# Patient Record
Sex: FEMALE | Race: WHITE | NOT HISPANIC OR LATINO | Employment: FULL TIME | URBAN - METROPOLITAN AREA
[De-identification: names, ages, dates, MRNs, and addresses within clinical notes are randomized per-mention and may not be internally consistent; named-entity substitution may affect disease eponyms.]

---

## 2017-01-09 ENCOUNTER — TRANSCRIBE ORDERS (OUTPATIENT)
Dept: ADMINISTRATIVE | Facility: HOSPITAL | Age: 57
End: 2017-01-09

## 2017-01-09 DIAGNOSIS — Z13.9 SCREENING: Primary | ICD-10-CM

## 2017-02-02 ENCOUNTER — GENERIC CONVERSION - ENCOUNTER (OUTPATIENT)
Dept: OTHER | Facility: OTHER | Age: 57
End: 2017-02-02

## 2017-02-02 ENCOUNTER — HOSPITAL ENCOUNTER (OUTPATIENT)
Dept: MAMMOGRAPHY | Facility: CLINIC | Age: 57
Discharge: HOME | End: 2017-02-02
Payer: COMMERCIAL

## 2017-02-02 DIAGNOSIS — Z13.9 SCREENING: ICD-10-CM

## 2017-02-02 DIAGNOSIS — Z12.31 ENCOUNTER FOR SCREENING MAMMOGRAM FOR MALIGNANT NEOPLASM OF BREAST: ICD-10-CM

## 2017-02-02 PROCEDURE — G0202 SCR MAMMO BI INCL CAD: HCPCS

## 2017-04-06 ENCOUNTER — ALLSCRIPTS OFFICE VISIT (OUTPATIENT)
Dept: OTHER | Facility: OTHER | Age: 57
End: 2017-04-06

## 2017-04-06 DIAGNOSIS — E04.1 NONTOXIC SINGLE THYROID NODULE: ICD-10-CM

## 2017-04-06 DIAGNOSIS — E05.00 THYROTOXICOSIS WITH DIFFUSE GOITER AND WITHOUT THYROID STORM: ICD-10-CM

## 2017-04-14 ENCOUNTER — GENERIC CONVERSION - ENCOUNTER (OUTPATIENT)
Dept: OTHER | Facility: OTHER | Age: 57
End: 2017-04-14

## 2017-04-14 LAB
BASOPHILS # BLD AUTO: 0 %
BASOPHILS # BLD AUTO: 0 X10E3/UL (ref 0–0.2)
DEPRECATED RDW RBC AUTO: 15.2 % (ref 12.3–15.4)
EOSINOPHIL # BLD AUTO: 0.1 X10E3/UL (ref 0–0.4)
EOSINOPHIL # BLD AUTO: 2 %
HCT VFR BLD AUTO: 41.9 % (ref 34–46.6)
HGB BLD-MCNC: 13.7 G/DL (ref 11.1–15.9)
IMM.GRANULOCYTES (CD4/8) (HISTORICAL): 0 %
IMM.GRANULOCYTES (CD4/8) (HISTORICAL): 0 X10E3/UL (ref 0–0.1)
LYMPHOCYTES # BLD AUTO: 1.5 X10E3/UL (ref 0.7–3.1)
LYMPHOCYTES # BLD AUTO: 28 %
MCH RBC QN AUTO: 28 PG (ref 26.6–33)
MCHC RBC AUTO-ENTMCNC: 32.7 G/DL (ref 31.5–35.7)
MCV RBC AUTO: 86 FL (ref 79–97)
MONOCYTES # BLD AUTO: 0.7 X10E3/UL (ref 0.1–0.9)
MONOCYTES (HISTORICAL): 13 %
NEUTROPHILS # BLD AUTO: 3 X10E3/UL (ref 1.4–7)
NEUTROPHILS # BLD AUTO: 57 %
PLATELET # BLD AUTO: 234 X10E3/UL (ref 150–379)
RBC (HISTORICAL): 4.89 X10E6/UL (ref 3.77–5.28)
WBC # BLD AUTO: 5.3 X10E3/UL (ref 3.4–10.8)

## 2017-04-15 LAB
T3FREE SERPL-MCNC: 85 NG/DL (ref 71–180)
T4 FREE SERPL-MCNC: 0.81 NG/DL (ref 0.82–1.77)
TSH SERPL DL<=0.05 MIU/L-ACNC: 0.06 UIU/ML (ref 0.45–4.5)

## 2017-04-16 ENCOUNTER — GENERIC CONVERSION - ENCOUNTER (OUTPATIENT)
Dept: OTHER | Facility: OTHER | Age: 57
End: 2017-04-16

## 2017-09-19 ENCOUNTER — ALLSCRIPTS OFFICE VISIT (OUTPATIENT)
Dept: OTHER | Facility: OTHER | Age: 57
End: 2017-09-19

## 2017-09-25 ENCOUNTER — GENERIC CONVERSION - ENCOUNTER (OUTPATIENT)
Dept: OTHER | Facility: OTHER | Age: 57
End: 2017-09-25

## 2017-09-26 ENCOUNTER — GENERIC CONVERSION - ENCOUNTER (OUTPATIENT)
Dept: OTHER | Facility: OTHER | Age: 57
End: 2017-09-26

## 2017-09-26 LAB
T4 FREE SERPL-MCNC: 1.76 NG/DL (ref 0.82–1.77)
TSH SERPL DL<=0.05 MIU/L-ACNC: <0.005 UIU/ML (ref 0.45–4.5)

## 2017-09-30 ENCOUNTER — HOSPITAL ENCOUNTER (OUTPATIENT)
Dept: ULTRASOUND IMAGING | Facility: HOSPITAL | Age: 57
Discharge: HOME | End: 2017-09-30
Attending: INTERNAL MEDICINE
Payer: COMMERCIAL

## 2017-09-30 DIAGNOSIS — E05.00 THYROTOXICOSIS WITH DIFFUSE GOITER AND WITHOUT THYROID STORM: ICD-10-CM

## 2017-09-30 DIAGNOSIS — E04.1 NONTOXIC SINGLE THYROID NODULE: ICD-10-CM

## 2017-09-30 PROCEDURE — 76536 US EXAM OF HEAD AND NECK: CPT

## 2017-10-09 ENCOUNTER — GENERIC CONVERSION - ENCOUNTER (OUTPATIENT)
Dept: OTHER | Facility: OTHER | Age: 57
End: 2017-10-09

## 2017-11-02 DIAGNOSIS — E05.90 THYROTOXICOSIS WITHOUT THYROID STORM: ICD-10-CM

## 2018-01-10 NOTE — RESULT NOTES
Verified Results  * MAMMO SCREENING BILATERAL W CAD [id number] 08:35AM Tianna العلي     Test Name Result Flag Reference   MAMMO SCREENING BILATERAL W CAD (Report)     Patient History:   Patient is postmenopausal    Family history of breast cancer at age 34 in sister  Retro-pectoral silicone gel implants in both breasts, Fiorella 10,    2010  Taking estrogen for 7 months  Patient is a some day smoker  Patient's BMI is 21 3  Reason for exam: history of breast augmentation, asymptomatic  Mammo Screening Bilateral W CAD: February 2, 2017 - Check In #:    [de-identified]   Bilateral MLO, CC, and ID view(s) were taken  Technologist: Verlinda Mohs, R T (R)(M)   Prior study comparison: February 1, 2016, mammo screening    bilateral W CAD performed at [facility]  November 11, 2014, digital bilateral    screening mammogram performed at [facility]  November 4, 2013, digital bilateral screening   mammogram performed at [address] [place] [address]  November 8, 2011, digital bilateral screening mammogram    performed at [facility]  June 11, 2010, digital bilateral screening mammogram performed at [facility]  There are scattered fibroglandular densities  No dominant soft tissue mass, architectural distortion or    suspicious calcifications are noted  The skin and nipple    structures are within normal limits  Scattered benign appearing    calcifications are noted  No mammographic evidence of malignancy  No    significant changes when compared with prior studies  ACR BI-RADS® Assessments: BiRad:2 - Benign     Recommendation:   Routine screening mammogram of both breasts in 1 year  A    reminder letter will be sent  Analyzed by CAD     8-10% of cancers will be missed on mammography   Management of a    palpable abnormality must be based on clinical grounds  Patients   will be notified of their results via letter from our facility  Accredited by [employer] and FDA  Transcription Location: NICHOLAS Good 98: [id number]     Risk Value(s):   Tyrer-Cuzick 10 Year: 5 200%, Tyrer-Cuzick Lifetime: 16 600%,    Myriad Table: 2 6%, AFSHIN 5 Year: 2 5%, NCI Lifetime: 16 5%   Signed by: Alistair Negron MD   2/2/17       Plan  Encounter for screening mammogram for malignant neoplasm of breast    · * MAMMO SCREENING BILATERAL W CAD; Status:Active;  Requested for:[id number];

## 2018-01-11 ENCOUNTER — ALLSCRIPTS OFFICE VISIT (OUTPATIENT)
Dept: OTHER | Facility: OTHER | Age: 58
End: 2018-01-11

## 2018-01-11 DIAGNOSIS — M47.812 SPONDYLOSIS OF CERVICAL REGION WITHOUT MYELOPATHY OR RADICULOPATHY: ICD-10-CM

## 2018-01-11 NOTE — RESULT NOTES
Verified Results  (1) TSH [id number] 10:14AM Alice Tracy     Test Name Result Flag Reference   TSH <0 005 uIU/mL L 0 450-4 500     (1) T3 TOTAL [id number] 10:14AM Ish Rileyat     Test Name Result Flag Reference   Triiodothyronine (T3) 243 ng/dL H      (1) FREE T3 [id number] 10:14AM Ish Muscat     Test Name Result Flag Reference   Triiodothyronine,Free,Serum 7 8 pg/mL H 2 0-4 4     (LC) Thyroxine (T4) Free, Direct, S [id number] 10:14AM Ish Muscat     Test Name Result Flag Reference   T4,Free(Direct) 2 16 ng/dL H 0 82-1 77

## 2018-01-12 ENCOUNTER — TRANSCRIBE ORDERS (OUTPATIENT)
Dept: ADMINISTRATIVE | Facility: HOSPITAL | Age: 58
End: 2018-01-12

## 2018-01-12 ENCOUNTER — HOSPITAL ENCOUNTER (OUTPATIENT)
Dept: RADIOLOGY | Facility: HOSPITAL | Age: 58
Discharge: HOME | End: 2018-01-12
Attending: INTERNAL MEDICINE
Payer: COMMERCIAL

## 2018-01-12 DIAGNOSIS — M47.812 SPONDYLOSIS OF CERVICAL REGION WITHOUT MYELOPATHY OR RADICULOPATHY: ICD-10-CM

## 2018-01-12 PROCEDURE — 72050 X-RAY EXAM NECK SPINE 4/5VWS: CPT

## 2018-01-12 NOTE — RESULT NOTES
Message   Thyroid labs have improved significantly, please leave the numbers for each lab test on msg, she wanted to know  Reduce methimazole to 15mg daily (1 5 tab) check TSH/Free T4 in 1 month        Verified Results  (1) TSH [id number] 10:38AM [facility]     Test Name Result Flag Reference   TSH <0 005 uIU/mL L 0 450-4 500     (1) T3 TOTAL [id number] 10:38AM [facility]     Test Name Result Flag Reference   Triiodothyronine (T3) 157 ng/dL       (LC) Thyroxine (T4) Free, Direct, S [id number] 10:38AM [facility]     Test Name Result Flag Reference   T4,Free(Direct) 1 63 ng/dL  0 82-1 77

## 2018-01-12 NOTE — RESULT NOTES
Verified Results  * MAMMO SCREENING BILATERAL W CAD [id number] 07:51AM Ivanna John     Test Name Result Flag Reference   MAMMO SCREENING BILATERAL W CAD (Report)     Patient History:   Patient is postmenopausal    Family history of unknown cancer in maternal aunt under age 48    and breast cancer in sister at age 27  Retro-pectoral silicone gel implants in both breasts, Fiorella 10,    2010  Patient is a some day smoker  Patient's BMI is 19 5  Reason for exam: screening (asymptomatic)  Mammo Screening Bilateral W CAD: February 1, 2016 - Check In #:    [de-identified]   Bilateral MLO, CC, and ID view(s) were taken  Technologist: NICHOLAS Robin (R)(M)   Prior study comparison: November 11, 2014, digital bilateral    screening mammogram performed at [facility]  November 4, 2013, digital bilateral screening   mammogram performed at [address] [place] [address]  November 8, 2011, digital bilateral screening mammogram    performed at [facility]  June 11, 2010, digital bilateral screening mammogram performed at [facility]  There are scattered fibroglandular densities  No dominant soft tissue mass, architectural distortion or    suspicious calcifications are noted  The skin and nipple    structures are within normal limits  Scattered benign appearing    calcifications are noted  No mammographic evidence of malignancy  No    significant changes when compared with prior studies  ASSESSMENT: BiRad:2 - Benign     Recommendation:   Routine screening mammogram of both breasts in 1 year  A    reminder letter will be sent  Analyzed by CAD     8-10% of cancers will be missed on mammography  Management of a    palpable abnormality must be based on clinical grounds  Patients   will be notified of their results via letter from our facility     Accredited by [employer] and FDA  Transcription Location: NICHOLAS Good 98: [id number]     Risk Value(s):   Tyrer-Cuzick 10 Year: 4 480%, Tyrer-Cuzick Lifetime: 15 248%,    Myriad Table: 2 6%, AFSHIN 5 Year: 2 4%, NCI Lifetime: 16 8%   Signed by:    Sebastian Hahn MD   2/1/16

## 2018-01-13 ENCOUNTER — GENERIC CONVERSION - ENCOUNTER (OUTPATIENT)
Dept: OTHER | Facility: OTHER | Age: 58
End: 2018-01-13

## 2018-01-13 VITALS
SYSTOLIC BLOOD PRESSURE: 110 MMHG | HEART RATE: 80 BPM | DIASTOLIC BLOOD PRESSURE: 60 MMHG | BODY MASS INDEX: 23.73 KG/M2 | WEIGHT: 151.19 LBS | HEIGHT: 67 IN

## 2018-01-13 NOTE — RESULT NOTES
Verified Results  (1) COMPREHENSIVE METABOLIC PANEL [id number] 58:82XC [facility]     Test Name Result Flag Reference   Glucose, Serum 91 mg/dL  65-99   BUN 5 mg/dL L 6-24   Creatinine, Serum 0 67 mg/dL  0 57-1 00   eGFR If NonAfricn Am 99 mL/min/1 73  >59   eGFR If Africn Am 114 mL/min/1 73  >59   BUN/Creatinine Ratio 7 L 9-23   Sodium, Serum 142 mmol/L  134-144   Potassium, Serum 4 1 mmol/L  3 5-5 2   Chloride, Serum 102 mmol/L     Carbon Dioxide, Total 25 mmol/L  18-29   Calcium, Serum 9 6 mg/dL  8 7-10 2   Protein, Total, Serum 6 5 g/dL  6 0-8 5   Albumin, Serum 4 4 g/dL  3 5-5 5   Globulin, Total 2 1 g/dL  1 5-4 5   A/G Ratio 2 1  1 1-2 5   Bilirubin, Total 0 4 mg/dL  0 0-1 2   Alkaline Phosphatase, S 103 IU/L     AST (SGOT) 32 IU/L  0-40   ALT (SGPT) 44 IU/L H 0-32     (1) CBC/ PLT (NO DIFF) [id number] 11:46AM Chery Tarcy     Test Name Result Flag Reference   WBC 5 5 x10E3/uL  3 4-10 8   RBC 4 95 x10E6/uL  3 77-5 28   Hemoglobin 13 2 g/dL  11 1-15 9   Hematocrit 40 0 %  34 0-46  6   MCV 81 fL  79-97   MCH 26 7 pg  26 6-33 0   MCHC 33 0 g/dL  31 5-35 7   RDW 13 6 %  12 3-15 4   Platelets 470 R12G7/AN  150-379     (1) TSH [id number] 11:46AM [facility]     Test Name Result Flag Reference   TSH <0 005 uIU/mL L 0 450-4 500

## 2018-01-13 NOTE — PROGRESS NOTES
Assessment   1  Spondylosis of cervical region without myelopathy or radiculopathy (721 0) (M47 812)   2  GERD (gastroesophageal reflux disease) (530 81) (K21 9)   3  Graves disease (242 00) (E05 00)    Plan   GERD (gastroesophageal reflux disease)    · Pantoprazole Sodium 40 MG Oral Tablet Delayed Release; Take 1 tablet daily  Graves disease    · 1 - Moni LUCAS, Tresa Canavan  (Endocrinology) Co-Management  *  Status: Active  Requested    for: 66TSU7686  Care Summary provided  : Yes  Spondylosis of cervical region without myelopathy or radiculopathy    · * XR SPINE CERVICAL COMPLETE 4 OR 5 VW NON INJURY; Status:Active; Requested    BRF:12FOP2814;    · *1 -  PHYSICAL THERAPY-[facility] Co-Management  *  Status: Active     Requested for: 28HVN4297  Care Summary provided  : Yes    Chief Complaint   Pt is here today c/o of neck strain and pain this has been bothering her for awhile however recently it has gotten much worse  She has pain when she lifts her purse  Medications are current DD      History of Present Illness   Gastroesophageal Reflux Disease (Follow-Up): The patient is being seen for follow-up of gastroesophageal reflux disease  The patient reports doing well  Interval symptoms:  denies heartburn,-- denies abdominal pain,-- denies acid regurgitation-- and-- denies dysphagia  Associated symptoms: no melena-- and-- no weight loss  Medications:  the patient is adherent to her medication regimen  Neck Pain: The patient is being seen for an initial evaluation of neck pain  The patient presents with complaints of gradual onset of constant episodes of moderate bilateral posterior neck pain, described as sharp, radiating to the right trapezius  Episodes started 5 months ago  Symptoms are improved by ice  Symptoms are made worse by neck movement  Symptoms are worsening  Review of Systems        Cardiovascular: no chest pain  Respiratory: no shortness of breath-- and-- no cough  Gastrointestinal: as noted in HPI  Neurological: no numbness-- and-- no tingling  Active Problems   1  Allergic rhinitis (477 9) (J30 9)   2  GERD (gastroesophageal reflux disease) (530 81) (K21 9)   3  Graves disease (242 00) (E05 00)    Past Medical History   Active Problems And Past Medical History Reviewed: The active problems and past medical history were reviewed and updated today  Social History    · Being A Social Drinker   · Caffeine Use   · 3 CUPS PER DAY   · Cultural background   · NON-   · Current Smoker (V15 82)   ·    · Former smoker (V15 82) (M36 778)   · Previous [address]   · active   · Primary language is English   · Racial background   · WHITE  The social history was reviewed and updated today  Current Meds    1  MethIMAzole 5 MG Oral Tablet; TAKE 1 TABLET ONCE DAILY; Therapy: [id number] to (Shelli Bauer)  Requested for: 16PAU0335; Last     Rx:02Oct2017 Ordered   2  Pantoprazole Sodium 40 MG Oral Tablet Delayed Release; Take 1 tablet daily; Therapy: 46TAW6117 to (Warren Cuellarland)  Requested for: 26Oct2017 Ordered     The medication list was reviewed and updated today  Allergies   1  No Known Drug Allergies  2  Animal dander   3  Dust   4  Pollen   5  Trees    Vitals    Recorded: 38TJX7378 04:30PM   Temperature 98 2 F, Tympanic   Heart Rate 85, R DP   Pulse Quality Normal, R DP   Systolic 98, LUE, Sitting   Diastolic 60, LUE, Sitting   Height 5 ft 6 5 in   Weight 153 lb 0 5 oz   BMI Calculated 24 33   BSA Calculated 1 79   O2 Saturation 98, RA     Physical Exam        Constitutional      General appearance: No acute distress, well appearing and well nourished  Pulmonary      Auscultation of lungs: Clear to auscultation  Cardiovascular      Auscultation of heart: Normal rate and rhythm, normal S1 and S2, without murmurs         Musculoskeletal      Gait and station: Normal        Inspection/palpation of joints, bones, and muscles: Abnormal   Palpation - C7 tenderness  Neurologic      Reflexes: Abnormal  -- (upper extremity strength 5/5 b/l=, bulk is normal) Deep tendon reflexes: 1+ right biceps,-- 1+ left biceps,-- 1+ right triceps,-- 1+ left triceps,-- 1+ right brachioradialis-- and-- 1+ left brachioradialis  Sensation: No sensory loss  Sensory exam: intact to light touch  Future Appointments      Date/Time Provider Specialty Site   03/21/2018 08:00 AM MOI Watts   Endocrinology [facility] ENDOCRINOLOGY     Signatures    Electronically signed by : MOI Moreno ; Jan 11 2018  5:04PM EST                       (Author)

## 2018-01-14 VITALS
WEIGHT: 149.01 LBS | DIASTOLIC BLOOD PRESSURE: 86 MMHG | HEIGHT: 67 IN | BODY MASS INDEX: 23.39 KG/M2 | HEART RATE: 80 BPM | SYSTOLIC BLOOD PRESSURE: 118 MMHG

## 2018-01-14 NOTE — RESULT NOTES
Discussion/Summary   didnot show any distinct thyroid nodules , will monitor     Verified Results  [address] [place] [address] [id number] 10:41AM Lupe Telles Order Number: [id number]    - Patient Instructions: To schedule this appointment, please contact Central Scheduling at [phone]  Test Name Result Flag Reference   US THYROID (Report)     THYROID ULTRASOUND     INDICATION: Nontoxic thyroid nodule  COMPARISON: Thyroid ultrasound 10/29/2016     TECHNIQUE:  Ultrasound of the thyroid was performed with a high frequency linear transducer in transverse and sagittal planes including volumetric imaging sweeps as well as traditional still imaging technique  FINDINGS:   Thyroid parenchyma is diffusely heterogeneous in echotexture  Right gland: 3 9 x 1 7 x 1 2 cm  No dominant nodules  Left gland: 4 5 x 1 8 x 1 4 cm  No dominant nodules  Isthmus: The isthmus is 0 2 cm in AP dimension  IMPRESSION:      Heterogeneous appearance of the thyroid gland without discrete nodule               Workstation performed: [id number]     Signed by:   Vin Park MD   10/4/17

## 2018-01-14 NOTE — RESULT NOTES
Discussion/Summary   It appears that the thyroid is becoming overactive again-- resume methimazole at 5mg daily    Check TSH and Free T4 and Total T3 in 1 month     Verified Results  (1) TSH [id number] 11:38AM Rubin Abed     Test Name Result Flag Reference   TSH <0 005 uIU/mL L 0 450-4 500     (LC) Thyroxine (T4) Free, Direct, S [id number] 11:38AM Rubin Abed     Test Name Result Flag Reference   T4,Free(Direct) 1 76 ng/dL  0 82-1 77

## 2018-01-15 NOTE — RESULT NOTES
Verified Results  * NM THYROID Abdi Johnston UPTAKE(S) [id number] 07:38AM Cameron Campos Order Number: [de-identified]    - Patient Instructions: To schedule this appointment, please contact Central Scheduling at [phone]   Order Number: [id number]    - Patient Instructions: To schedule this appointment, please contact Central Scheduling at [phone]  Test Name Result Flag Reference   NM THYROID IMAGING W UPTAKE(S) (Report)     THYROID SCAN AND UPTAKE     INDICATION: Difficulty swallowing, weight loss     COMPARISON: None available     TECHNIQUE:    The study was performed following the oral administration of 253 uCi I-123  FINDINGS: There is asymmetric increased somewhat nodular activity in the left mid to lower thyroid, with relatively diminished activity in the right thyroid  Underlying hyperfunctioning nodule is not excluded  Activity is otherwise homogeneous  No    hypofunctioning nodular foci visualized  Thyroid uptake values are:     6 hours:  31 8% (N = 5 -15%)     24 hours: 45 5% (N =15 - 35%)       IMPRESSION:   1  Asymmetric increased nodular activity in the left mid to lower thyroid  Correlation with ultrasound recommended to exclude a hyperfunctioning nodule  If no discrete nodule is visualized, overall findings may also be due to Graves' disease         Workstation performed: [id number]     Signed by:   Reji Quinn MD   10/20/16

## 2018-01-15 NOTE — RESULT NOTES
Discussion/Summary   labs ok, decrease methimazole to 5 mg daily and repeat tsh and free t4 in 4 weeks      Verified Results  (1) TSH 14Apr2017 09:58AM Berry Watt     Test Name Result Flag Reference   TSH 0 056 uIU/mL L 0 450-4 500     (1) T3 TOTAL 14Apr2017 09:58AM Berry Watt     Test Name Result Flag Reference   Triiodothyronine (T3) 85 ng/dL       (1) CBC/PLT/DIFF 14Apr2017 09:58AM Berry Watt     Test Name Result Flag Reference   WBC 5 3 x10E3/uL  3 4-10 8   RBC 4 89 x10E6/uL  3 77-5 28   Hemoglobin 13 7 g/dL  11 1-15 9   Hematocrit 41 9 %  34 0-46  6   MCV 86 fL  79-97   MCH 28 0 pg  26 6-33 0   MCHC 32 7 g/dL  31 5-35 7   RDW 15 2 %  12 3-15 4   Platelets 627 G73N9/EL  150-379   Neutrophils 57 %     Lymphs 28 %     Monocytes 13 %     Eos 2 %     Basos 0 %     Neutrophils (Absolute) 3 0 x10E3/uL  1 4-7 0   Lymphs (Absolute) 1 5 x10E3/uL  0 7-3 1   Monocytes(Absolute) 0 7 x10E3/uL  0 1-0 9   Eos (Absolute) 0 1 x10E3/uL  0 0-0 4   Baso (Absolute) 0 0 x10E3/uL  0 0-0 2   Immature Granulocytes 0 %     Immature Grans (Abs) 0 0 x10E3/uL  0 0-0 1     (LC) Thyroxine (T4) Free, Direct, S 14Apr2017 09:58AM Berry Watt     Test Name Result Flag Reference   T4,Free(Direct) 0 81 ng/dL L 0 82-1 77

## 2018-01-17 NOTE — RESULT NOTES
Message   free t4 /t3 higher than before - start methimazole 20 mg daily , repeat tsh, free t4 and t3 in 1 month     call office if any fever , rashes or sore throat     Verified Results  (1) TSH [id number] 10:48AM [facility]     Test Name Result Flag Reference   TSH 0 011 uIU/mL L 0 450-4 500     (1) T3 TOTAL [id number] 10:48AM [facility]     Test Name Result Flag Reference   Triiodothyronine (T3) 289 ng/dL H      (LC) Thyroxine (T4) Free, Direct, S [id number] 10:48AM [facility]     Test Name Result Flag Reference   T4,Free(Direct) 3 08 ng/dL H 0 82-1 77     (LC) Thyroid Antibodies [id number] 10:48AM [facility]     Test Name Result Flag Reference   Thyroid Peroxidase (TPO) Ab 89 IU/mL H 0-34   Thyroglobulin Antibody Thyroglobulin Ab <1 0 IU/mL  0 0-0 9   Thyroglobulin Antibody measured by [facility]

## 2018-01-17 NOTE — RESULT NOTES
Verified Results  [address] [place] [address] [id number] 02:08PM Paty Gamez Order Number: [de-identified]   Performing Comments: L mid to lower lobe   - Patient Instructions: To schedule this appointment, please contact Central Scheduling at [phone]   Order Number: [id number]   Performing Comments: L mid to lower lobe   - Patient Instructions: To schedule this appointment, please contact Central Scheduling at [phone]  Test Name Result Flag Reference   US THYROID (Report)     THYROID ULTRASOUND     INDICATION: Difficulty swallowing and weight loss, abnormal thyroid scintigraphy     COMPARISON: Thyroid scintigraphy from 10/19/2016     TECHNIQUE:  Ultrasound of the thyroid was performed with a high frequency linear transducer in transverse and sagittal planes including volumetric imaging sweeps as well as traditional still imaging technique  FINDINGS:   Diffusely heterogeneous with multiple hypoechoic areas throughout  Right gland: 1 8 x 1 5 x 4 4 cm  Subcentimeter hypoechoic areas are measured though based on cine imaging, these appear ill-defined and are likely areas of heterogeneity  Left gland: 2 0 x 1 8 x 4 6 cm  Subcentimeter hypoechoic areas are measured though based on cine imaging, these appear ill-defined and are likely areas of heterogeneity  Isthmus: 0 3 cm in AP dimension  No dominant nodules  IMPRESSION:      Diffusely heterogeneous thyroid with multiple ill-defined areas of hypoechogenicity bilaterally  These do not appear to represent discrete nodules though continued follow-up is recommended                Workstation performed: [id number]     Signed by:   Marcial Driver MD   10/31/16

## 2018-01-23 VITALS
WEIGHT: 153.03 LBS | HEIGHT: 67 IN | HEART RATE: 85 BPM | DIASTOLIC BLOOD PRESSURE: 60 MMHG | SYSTOLIC BLOOD PRESSURE: 98 MMHG | TEMPERATURE: 98.2 F | OXYGEN SATURATION: 98 % | BODY MASS INDEX: 24.02 KG/M2

## 2018-01-31 ENCOUNTER — TRANSCRIBE ORDERS (OUTPATIENT)
Dept: ADMINISTRATIVE | Facility: HOSPITAL | Age: 58
End: 2018-01-31

## 2018-01-31 DIAGNOSIS — Z12.39 BREAST SCREENING: Primary | ICD-10-CM

## 2018-02-05 ENCOUNTER — EVALUATION (OUTPATIENT)
Dept: PHYSICAL THERAPY | Facility: CLINIC | Age: 58
End: 2018-02-05
Payer: COMMERCIAL

## 2018-02-05 DIAGNOSIS — M47.812 OSTEOARTHRITIS OF CERVICAL SPINE, UNSPECIFIED SPINAL OSTEOARTHRITIS COMPLICATION STATUS: Primary | ICD-10-CM

## 2018-02-05 PROCEDURE — 97140 MANUAL THERAPY 1/> REGIONS: CPT

## 2018-02-05 PROCEDURE — G8982 BODY POS GOAL STATUS: HCPCS

## 2018-02-05 PROCEDURE — G8981 BODY POS CURRENT STATUS: HCPCS

## 2018-02-05 PROCEDURE — 97161 PT EVAL LOW COMPLEX 20 MIN: CPT

## 2018-02-05 RX ORDER — PANTOPRAZOLE SODIUM 40 MG/1
40 TABLET, DELAYED RELEASE ORAL DAILY
COMMUNITY
End: 2018-07-31 | Stop reason: SDUPTHER

## 2018-02-05 RX ORDER — METHIMAZOLE 5 MG/1
5 TABLET ORAL DAILY
COMMUNITY
End: 2018-10-11 | Stop reason: SDUPTHER

## 2018-02-05 NOTE — PROGRESS NOTES
PT Evaluation     Today's date: 2018  Patient name: Yayo Freitas  : 1960  MRN: [id number]  Referring provider: Johnny Peralta MD  Dx:   Encounter Diagnosis   Name Primary?  Osteoarthritis of cervical spine, unspecified spinal osteoarthritis complication status Yes                  Assessment  Impairments: abnormal or restricted ROM and pain with function  Functional limitations: unable to carry purse on R shoulder without pain; unable to work at desk or long periods without pain  Assessment details: Pt is a 65year old RHD female who presents to PT with complaints of chronic R sided neck pain  She presents with mild tenderness to R UT with severe tension to BL UT superior portion  She has adequate strength in both upper extremities and a fair to good posture in her cervical spine  She has some limitations in her lateral flexion ROM due to some joint stiffness and muscle tension  She will benefit from skilled PT to help reduce her pain, increase flexibility, increase ROM, further improve her posture, and improve overall functioning   Thank you kindly for your referral    Understanding of Dx/Px/POC: good   Prognosis: good    Goals  ST: Decrease pain 2-3 grades on VAS  2: Increase ROM 10-15 degrees  3: Decrease UT tension 25-50% in 4 weeks    LT: Improve tolerance to work activities by 25-50% in 4-6 weeks  2: Pt able to carry her purse without pain in 4-6 weeks    Plan  Patient would benefit from: skilled PT  Planned modality interventions: thermotherapy: hydrocollator packs and ultrasound  Planned therapy interventions: massage, manual therapy, postural training, strengthening, stretching, therapeutic exercise and flexibility  Other planned therapy interventions: IASTM to BL UTs  Frequency: 2x week  Duration in visits: 12  Duration in weeks: 6  Treatment plan discussed with: patient  Plan details: Initiate PT as per POC        Subjective Evaluation    History of Present Illness  Date of onset: 2017  Mechanism of injury: Off/on couple years, recently past 6 months symptoms have worsened  No previous injuries  Pain goes down to R UT  RHD  X-ray 18  Carrying purse and coat, stabbing pain into neck  Does bootcamp, 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  Anything she has to carry, sitting at computer for awhile  No sleep disturbance from neck pain        Recurrent probem  Quality of life: good    Pain  Current pain ratin  At best pain ratin  At worst pain ratin  Location: R UT area  Quality: throbbing  Relieving factors: ice and medications  Aggravating factors: keyboarding and lifting  Progression: worsening    Social Support    Employment status: working ()  Hand dominance: right  Exercise history: boot camp      Diagnostic Tests  X-ray: abnormal (mild degnerative changes)  Patient Goals  Patient goals for therapy: decreased pain and return to sport/leisure activities          Objective     Postural Observations  Seated posture: good  Standing posture: good  Correction of posture: has no consistent effect    Additional Postural Observation Details  Tenderness to BL UT, severe tension to BL UTs    Active Range of Motion   Cervical/Thoracic Spine   Cervical    Flexion: 40 degrees   Extension: 40 degrees   Left lateral flexion: 30 degrees   Right lateral flexion: 30 degrees   Left rotation: 55 degrees   Right rotation: 50 degrees     Passive Range of Motion     Additional Passive Range of Motion Details  Decrease mobility BL UT  L SCM tightness      Joint Play Hypomobile: C4, C5, C6 and C7   Comments: Mild tenderness to P-A mobs, mild loss of joint play    Strength/Myotome Testing     Left Shoulder     Planes of Motion   Flexion: 4+   Extension: 4+   Abduction: 4+   Adduction: 4+   External rotation at 0°: 4+   Internal rotation at 0°: 5     Isolated Muscles   Lower trapezius: 4   Middle trapezius: 4   Upper trapezius: 5     Right Shoulder     Planes of Motion   Flexion: 4+   Extension: 5 Abduction: 4+   Adduction: 4+   External rotation at 0°: 4+   Internal rotation at 0°: 5     Isolated Muscles   Lower trapezius: 4+   Middle trapezius: 4+   Upper trapezius: 5     Left Elbow   Flexion: 5  Extension: 4+    Right Elbow   Flexion: 4+  Extension: 4+    Left Wrist/Hand   Wrist extension: 5  Wrist flexion: 5    Right Wrist/Hand   Wrist extension: 5  Wrist flexion: 5    Tests   Cervical     Left   Negative Spurling's sign  Right   Positive Spurling's sign     Negative cervical distraction and cervical compression test       Additional Tests Details  Mild relief with cervical distraction in supine      Precautions:none    Daily Treatment Diary     Manual              Manual cervical traction             IASTM BL UTs             STM             TP release                              Exercise Diary              Cervical retraction 10x10"            TB row, ext             Seated cervical SB stretch             Corner stretch             1/2 roll stretch                                                                                                                                                                                                                    Modalities              MH prior to txt             US to R NICHOLAS Gramajo C/S

## 2018-02-07 ENCOUNTER — APPOINTMENT (OUTPATIENT)
Dept: PHYSICAL THERAPY | Facility: CLINIC | Age: 58
End: 2018-02-07
Payer: COMMERCIAL

## 2018-02-08 ENCOUNTER — TELEPHONE (OUTPATIENT)
Dept: FAMILY MEDICINE CLINIC | Facility: HOSPITAL | Age: 58
End: 2018-02-08

## 2018-02-08 ENCOUNTER — HOSPITAL ENCOUNTER (OUTPATIENT)
Dept: MAMMOGRAPHY | Facility: CLINIC | Age: 58
Discharge: HOME | End: 2018-02-08
Payer: COMMERCIAL

## 2018-02-08 ENCOUNTER — OFFICE VISIT (OUTPATIENT)
Dept: PHYSICAL THERAPY | Facility: CLINIC | Age: 58
End: 2018-02-08
Payer: COMMERCIAL

## 2018-02-08 DIAGNOSIS — Z12.39 BREAST SCREENING: ICD-10-CM

## 2018-02-08 DIAGNOSIS — M47.812 OSTEOARTHRITIS OF CERVICAL SPINE, UNSPECIFIED SPINAL OSTEOARTHRITIS COMPLICATION STATUS: Primary | ICD-10-CM

## 2018-02-08 DIAGNOSIS — Z12.39 SCREENING BREAST EXAMINATION: Primary | ICD-10-CM

## 2018-02-08 DIAGNOSIS — Z12.39 SCREENING BREAST EXAMINATION: ICD-10-CM

## 2018-02-08 PROCEDURE — 97010 HOT OR COLD PACKS THERAPY: CPT

## 2018-02-08 PROCEDURE — 97140 MANUAL THERAPY 1/> REGIONS: CPT

## 2018-02-08 PROCEDURE — 97035 APP MDLTY 1+ULTRASOUND EA 15: CPT

## 2018-02-08 PROCEDURE — 77063 BREAST TOMOSYNTHESIS BI: CPT

## 2018-02-08 PROCEDURE — 77067 SCR MAMMO BI INCL CAD: CPT

## 2018-02-08 NOTE — PROGRESS NOTES
Daily Note     Today's date: 2018  Patient name: Singh Iglesias  : 1960  MRN: [id number]  Referring provider: Agnieszka Peres MD  Dx:   Encounter Diagnosis   Name Primary?  Osteoarthritis of cervical spine, unspecified spinal osteoarthritis complication status Yes                  Subjective: No problem with HEP, does them right before boot camp class  Objective: See treatment diary below  Daily Treatment Diary     Manual              Manual cervical traction c            IASTM BL UTs c            STM c            TP release c             20                Exercise Diary              Cervical retraction 10x10"            TB row, ext NV            Seated cervical SB stretch NV            Corner stretch NV            1/2 roll stretch NV                                                                                                                                                                                                   5                Modalities              MH prior to txt 10 min            US to R Ut, R paraspials C/S 8             18                  Assessment: Tolerated treatment well  Patient exhibited good technique with therapeutic exercises  Performed majority of session with modalities and manuals, initiated cervical retraction as main exercise, pt had to leave for another appointment  Pt reported improved symptoms after session, felt looser  Add remaining exercises NV    Plan: Continue per plan of care

## 2018-02-12 ENCOUNTER — OFFICE VISIT (OUTPATIENT)
Dept: PHYSICAL THERAPY | Facility: CLINIC | Age: 58
End: 2018-02-12
Payer: COMMERCIAL

## 2018-02-12 DIAGNOSIS — M47.812 OSTEOARTHRITIS OF CERVICAL SPINE, UNSPECIFIED SPINAL OSTEOARTHRITIS COMPLICATION STATUS: Primary | ICD-10-CM

## 2018-02-12 PROCEDURE — 97140 MANUAL THERAPY 1/> REGIONS: CPT

## 2018-02-12 PROCEDURE — 97010 HOT OR COLD PACKS THERAPY: CPT

## 2018-02-12 PROCEDURE — 97110 THERAPEUTIC EXERCISES: CPT

## 2018-02-12 NOTE — PROGRESS NOTES
Daily Note     Today's date: 2018  Patient name: Judi Jaquez  : 1960  MRN: [id number]  Referring provider: Chandra Banerjee MD  Dx:   Encounter Diagnosis   Name Primary?  Osteoarthritis of cervical spine, unspecified spinal osteoarthritis complication status Yes                  Subjective: Actually feeling really good this morning  Leaving for [facility] [place] this week will be back for therapy next week  Objective: See treatment diary below  Daily Treatment Diary    Manual             Manual cervical traction c c           IASTM BL UTs c c           STM c c           TP release c c            20 20               Exercise Diary             Cervical retraction 10x10" 10x10"           TB row, ext NV BTB 2x10           Seated cervical SB stretch NV HEP             Corner stretch NV 3x30"           1/2 roll stretch NV 1min                                                                                                                                                                                                  5 15               Modalities              MH prior to txt 10 min 10 min           US to R Ut, R paraspials C/S 8 NP            18 10               Assessment: Tolerated treatment well  Patient exhibited good technique with therapeutic exercises   Pt still has significant tension to BL UT, reduced some with IASTM and STM  Slight improvement in lateral flexion ROM after manuals stretching  Pt reported feeling good after session  Plan: Continue per plan of care

## 2018-02-14 ENCOUNTER — APPOINTMENT (OUTPATIENT)
Dept: PHYSICAL THERAPY | Facility: CLINIC | Age: 58
End: 2018-02-14
Payer: COMMERCIAL

## 2018-02-15 ENCOUNTER — APPOINTMENT (OUTPATIENT)
Dept: PHYSICAL THERAPY | Facility: CLINIC | Age: 58
End: 2018-02-15
Payer: COMMERCIAL

## 2018-02-19 ENCOUNTER — APPOINTMENT (OUTPATIENT)
Dept: PHYSICAL THERAPY | Facility: CLINIC | Age: 58
End: 2018-02-19
Payer: COMMERCIAL

## 2018-02-21 ENCOUNTER — APPOINTMENT (OUTPATIENT)
Dept: PHYSICAL THERAPY | Facility: CLINIC | Age: 58
End: 2018-02-21
Payer: COMMERCIAL

## 2018-02-22 ENCOUNTER — APPOINTMENT (OUTPATIENT)
Dept: PHYSICAL THERAPY | Facility: CLINIC | Age: 58
End: 2018-02-22
Payer: COMMERCIAL

## 2018-02-26 ENCOUNTER — APPOINTMENT (OUTPATIENT)
Dept: PHYSICAL THERAPY | Facility: CLINIC | Age: 58
End: 2018-02-26
Payer: COMMERCIAL

## 2018-02-26 NOTE — RESULT NOTES
Verified Results  * XR SPINE CERVICAL COMPLETE 4 OR 5 VW NON INJURY 12Jan2018 10:30AM Stefanie Leon Order Number: [id number]]     Test Name Result Flag Reference   XR SPINE CERVICAL COMPLETE 4 OR 5 VW (Report)     CERVICAL SPINE     INDICATION: Neck pain  COMPARISON: None     VIEWS: 5; 5 images     FINDINGS:     Vertebral alignment is normal      The vertebral bodies are normal in height  There is no evidence of fracture, subluxation or dislocation  There is no bone destruction or osteoblastic lesion  The intervertebral disc spaces are preserved  Mild degenerative facet arthropathy is present in the lower cervical spine  The prevertebral soft tissues are normal in thickness  The lung apices are clear  IMPRESSION:     Mild facet arthritis in the lower cervical spine  Otherwise normal cervical spine series  Workstation performed: [id number]     Signed by:   Marcie Cash MD   1/12/18       Plan  Colon cancer screening    · COLONOSCOPY; Status:Active;  Requested [facility]:[id number];    · 2 - Ayaz Jordan MD, Maryuri Mariee  (Gastroenterology) Co-Management  *  Status: Active  Requested  for: [id number]  Care Summary provided  : Yes

## 2018-02-28 ENCOUNTER — APPOINTMENT (OUTPATIENT)
Dept: PHYSICAL THERAPY | Facility: CLINIC | Age: 58
End: 2018-02-28
Payer: COMMERCIAL

## 2018-03-01 ENCOUNTER — APPOINTMENT (OUTPATIENT)
Dept: PHYSICAL THERAPY | Facility: CLINIC | Age: 58
End: 2018-03-01
Payer: COMMERCIAL

## 2018-03-05 ENCOUNTER — OFFICE VISIT (OUTPATIENT)
Dept: PHYSICAL THERAPY | Facility: CLINIC | Age: 58
End: 2018-03-05
Payer: COMMERCIAL

## 2018-03-05 DIAGNOSIS — M47.812 OSTEOARTHRITIS OF CERVICAL SPINE, UNSPECIFIED SPINAL OSTEOARTHRITIS COMPLICATION STATUS: Primary | ICD-10-CM

## 2018-03-05 PROCEDURE — 97140 MANUAL THERAPY 1/> REGIONS: CPT

## 2018-03-05 PROCEDURE — 97110 THERAPEUTIC EXERCISES: CPT

## 2018-03-05 NOTE — PROGRESS NOTES
Daily Note     Today's date: 3/5/2018  Patient name: Irvin Evans  : 1960  MRN: [id number]  Referring provider: Abdullahi Lowery MD  Dx:   Encounter Diagnosis     ICD-10-CM    1  Osteoarthritis of cervical spine, unspecified spinal osteoarthritis complication status [id number]                   Subjective: Some days my neck is just fine and some days its aggravating  Sat in a pew last night and it seemed to really increase pain symptoms  Objective: See treatment diary below  Daily Treatment Diary    Manual  2/8 2/12 3/5          Manual cervical traction  C/S PROM c c 10          IASTM BL UTs c c 10          STM c c 5          TP release c c 5           20 20 30              Exercise Diary  2/8 2/12 3/5          Cervical retraction 10x10" 10x10" 10x10"          TB row, ext NV BTB 2x10 MTB 2x10          Seated cervical SB stretch NV HEP   5x10"          Corner stretch NV 3x30" NP          1/2 roll stretch NV 1min           Standing scaption   3# 30x                                                                                                                                                                                    5 15 10              Modalities              MH prior to txt 10 min 10 min NP          US to R Ut, R paraspials C/S 8 NP NP           18 10               Assessment: Tolerated treatment well  Patient exhibited good technique with therapeutic exercises and would benefit from continued PT   Pt had significant R UT/scalene tension, decreased lateral flexion  Muscle hypertonicity reduced after manuals and stretching  Pt reported improved symptoms after session  Plan: Continue per plan of care  Progress note during next visit

## 2018-03-08 ENCOUNTER — APPOINTMENT (OUTPATIENT)
Dept: PHYSICAL THERAPY | Facility: CLINIC | Age: 58
End: 2018-03-08
Payer: COMMERCIAL

## 2018-03-12 ENCOUNTER — APPOINTMENT (OUTPATIENT)
Dept: PHYSICAL THERAPY | Facility: CLINIC | Age: 58
End: 2018-03-12
Payer: COMMERCIAL

## 2018-03-15 ENCOUNTER — APPOINTMENT (OUTPATIENT)
Dept: PHYSICAL THERAPY | Facility: CLINIC | Age: 58
End: 2018-03-15
Payer: COMMERCIAL

## 2018-03-15 ENCOUNTER — TRANSCRIBE ORDERS (OUTPATIENT)
Dept: FAMILY MEDICINE CLINIC | Facility: HOSPITAL | Age: 58
End: 2018-03-15

## 2018-03-15 DIAGNOSIS — E05.00 GRAVES' DISEASE: Primary | ICD-10-CM

## 2018-03-19 ENCOUNTER — APPOINTMENT (OUTPATIENT)
Dept: PHYSICAL THERAPY | Facility: CLINIC | Age: 58
End: 2018-03-19
Payer: COMMERCIAL

## 2018-03-26 ENCOUNTER — OFFICE VISIT (OUTPATIENT)
Dept: PHYSICAL THERAPY | Facility: CLINIC | Age: 58
End: 2018-03-26
Payer: COMMERCIAL

## 2018-03-26 DIAGNOSIS — M47.812 OSTEOARTHRITIS OF CERVICAL SPINE, UNSPECIFIED SPINAL OSTEOARTHRITIS COMPLICATION STATUS: Primary | ICD-10-CM

## 2018-03-26 PROCEDURE — 97110 THERAPEUTIC EXERCISES: CPT

## 2018-03-26 PROCEDURE — 97140 MANUAL THERAPY 1/> REGIONS: CPT

## 2018-03-26 PROCEDURE — G8983 BODY POS D/C STATUS: HCPCS

## 2018-03-26 PROCEDURE — G8982 BODY POS GOAL STATUS: HCPCS

## 2018-03-26 PROCEDURE — 97112 NEUROMUSCULAR REEDUCATION: CPT

## 2018-03-26 PROCEDURE — G8981 BODY POS CURRENT STATUS: HCPCS

## 2018-03-26 NOTE — PROGRESS NOTES
PT Re-Evaluation     Today's date: 3/26/2018  Patient name: Malena Junior  : 1960  MRN: [id number]  Referring provider: Malena Lloyd MD  Dx:   Encounter Diagnosis   Name Primary?  Osteoarthritis of cervical spine, unspecified spinal osteoarthritis complication status Yes           Pt has not returned to therapy since 3/26/18 with follow up  D/C to HEP at this time  Assessment  Impairments: abnormal or restricted ROM  Functional limitations: able to carry purse/work at desk wihtout problems, only bothers her if she works a computer for long periods  Assessment details: Pt has improved in her symptoms, ROM, and overall functioning regarding her cervical pain  She still has BL scalene tightness seen in rotation and lateral flexion of cervical spine  She is able to work at computer and carry her purse with decreased frequency and intensity of pain with quicker resolution  Recommend another 4 weeks of PT with decreased frequency to assess home program and further improve her mobility  Thank you  Understanding of Dx/Px/POC: good   Prognosis: good    Goals  ST: Decrease pain 2-3 grades on VAS- met  2:  Increase ROM 10-15 degrees- not met  3: Decrease UT tension 25-50% in 4 weeks- partially met    LT: Improve tolerance to work activities by 25-50% in 4-6 weeks- met  2: Pt able to carry her purse without pain in 4-6 weeks- met    Plan  Patient would benefit from: skilled PT  Planned modality interventions: thermotherapy: hydrocollator packs  Planned therapy interventions: massage, manual therapy, postural training, strengthening, stretching, therapeutic exercise, flexibility, neuromuscular re-education, patient education, joint mobilization and home exercise program  Frequency: 2x week  Duration in visits: 4  Duration in weeks: 4  Treatment plan discussed with: patient  Plan details: Continue PT as per POC        Subjective Evaluation    History of Present Illness  Date of onset: 2017  Mechanism of injury: Off/on couple years, recently past 6 months symptoms have worsened  No previous injuries  Pain goes down to R UT  RHD  X-ray 18  Re-eval: Pt reports improved symptoms; if pain sets in it doesn't last long  Sometimes gets pain if working at computer too long  Stretching helps  Shoveling snow the other day now pain has moved to mid thoracic area  Hasn't been taking any ibuprofen         Recurrent probem    Quality of life: good    Pain  Current pain ratin  At best pain ratin  At worst pain ratin  Location: R UT area  Quality: dull ache  Relieving factors: ice and medications  Aggravating factors: keyboarding and lifting  Progression: worsening    Social Support    Employment status: working ()  Hand dominance: right  Exercise history: boot camp      Diagnostic Tests  X-ray: abnormal (mild degnerative changes)  Treatments  Current treatment: physical therapy  Patient Goals  Patient goals for therapy: decreased pain and return to sport/leisure activities  Patient goal: no pain when working at computer        Objective     Postural Observations  Seated posture: good  Standing posture: good  Correction of posture: has no consistent effect    Additional Postural Observation Details  Decreased tenderness to BL Ut's, tenderness to mid thoracic paraspinals R>L  Pt still has a mild forward head posture in normal sitting  With prone scap retraction, observable medial winging of R scapula, R scapula held aaron little more IR than L    Active Range of Motion   Cervical/Thoracic Spine   Cervical    Flexion: 52 degrees   Extension: 40 degrees   Left lateral flexion: 30 degrees   Right lateral flexion: 30 degrees   Left rotation: 45 degrees   Right rotation: 50 degrees     Additional Active Range of Motion Details  Sight loss in rotation, lateral flexion stable, improvement in cervical flexion      Passive Range of Motion     Additional Passive Range of Motion Details  Decrease mobility BL UT  BL middle scalenes tight, limits lateral flexion      Joint Play   Comments: Mild tenderness to P-A mobs, mild loss of joint play    Strength/Myotome Testing     Left Shoulder     Planes of Motion   Flexion: 4+   Extension: 4+   Abduction: 4+   Adduction: 4+   External rotation at 0°: 4+   Internal rotation at 0°: 5     Isolated Muscles   Lower trapezius: 4   Middle trapezius: 4+   Upper trapezius: 5     Right Shoulder     Planes of Motion   Flexion: 4+   Extension: 5   Abduction: 4+   Adduction: 4+   External rotation at 0°: 4+   Internal rotation at 0°: 5     Isolated Muscles   Lower trapezius: 4+   Middle trapezius: 4+   Upper trapezius: 5     Left Elbow   Flexion: 5  Extension: 4+    Right Elbow   Flexion: 4+  Extension: 4+    Left Wrist/Hand   Wrist extension: 5  Wrist flexion: 5    Right Wrist/Hand   Wrist extension: 5  Wrist flexion: 5    Tests   Cervical     Left   Negative Spurling's sign  Right   Negative cervical distraction, Spurling's sign and cervical compression test       Additional Tests Details  Mild relief with cervical distraction in supine      Precautions:none    Daily Treatment Diary    Manual  2/8 2/12 3/5 3/26         Manual cervical traction   C/S PROM c c 10 10         IASTM BL UTs c c 10 10 prone parapsinals thoracic         STM c c 5 P-A mobs mid thoracic         TP release c c 5           20 20 30 20             Exercise Diary  2/8 2/12 3/5 3/26         Cervical retraction 10x10" 10x10" 10x10" 15x5'  peach TB         TB row, ext NV BTB 2x10 MTB 2x10 CC 45# 2x10 each         Seated cervical SB stretch NV HEP   5x10" 5x10"         Corner stretch NV 3x30" NP          1/2 roll stretch NV 1min           Standing scaption   3# 30x          Prone scap retraction/shext    15x5" each         TP release with tennis ball    5 min 5 15 10 10/15             Modalities              MH prior to txt 10 min 10 min NP NP         US to R Ut, R paraspials C/S 8 NP NP NP          18 10             *add MH NV to better loosen soft tissues prior to stretching     HEP: cervical retraction, rotation, flexion and SB stretching, prone scap retraction, corner stretch, tennis ball TP release

## 2018-04-10 NOTE — PROGRESS NOTES
Established Patient Progress Note       Chief Complaint   Patient presents with    Hyperthyroidism          History of Present Illness:     Fracisco Hernandez is a 65 y o  female with a history of hyperthyroidism due to Graves and thyroid nodule  For the hyperthyroidism she was restarted on methimazole 5 mg in September 2017 after she had self discontinued it in May 2017  She did not complete labs after restarting methimazole  She denies symptoms of hypo or hyperthyroidism  For the thyroid nodule she had an US in September 2017 which showed no discrete thyroid nodule  She denies neck pain, dysphonia, dysphagia, or dyspnea  Patient Active Problem List   Diagnosis    Graves disease    Hyperthyroidism    Thyroid nodule      Past Medical History:   Diagnosis Date    Allergic rhinitis     Esophageal reflux     Graves' disease       Past Surgical History:   Procedure Laterality Date    NO PAST SURGERIES        Family History   Problem Relation Age of Onset    Thyroid disease unspecified Father     Thyroid disease unspecified Brother      Social History   Substance Use Topics    Smoking status: Never Smoker    Smokeless tobacco: Never Used    Alcohol use Yes     No Known Allergies    Current Outpatient Prescriptions:     methimazole (TAPAZOLE) 5 mg tablet, Take 5 mg by mouth daily  , Disp: , Rfl:     pantoprazole (PROTONIX) 40 mg tablet, Take 40 mg by mouth daily, Disp: , Rfl:     Review of Systems   Constitutional: Negative for chills and fever  HENT: Negative for sore throat and trouble swallowing  Eyes: Negative for visual disturbance  Respiratory: Negative for shortness of breath  Cardiovascular: Negative for chest pain and palpitations  Gastrointestinal: Negative for abdominal pain, constipation and diarrhea  Endocrine: Negative for cold intolerance, heat intolerance, polydipsia, polyphagia and polyuria  Genitourinary: Negative for frequency     Musculoskeletal: Negative for arthralgias and myalgias  Skin: Negative for rash  Neurological: Negative for dizziness and tremors  Hematological: Negative for adenopathy  Psychiatric/Behavioral: Negative for sleep disturbance  All other systems reviewed and are negative  Physical Exam:  Body mass index is 24 54 kg/m²  /80   Pulse 70   Ht 5' 7 5" (1 715 m)   Wt 72 1 kg (159 lb)   BMI 24 54 kg/m²    Wt Readings from Last 3 Encounters:   04/11/18 72 1 kg (159 lb)   01/11/18 69 4 kg (153 lb 0 5 oz)   09/19/17 67 6 kg (149 lb 0 1 oz)       Physical Exam   Constitutional: She is oriented to person, place, and time  She appears well-developed and well-nourished  No distress  HENT:   Head: Normocephalic and atraumatic  Eyes: Conjunctivae are normal  Pupils are equal, round, and reactive to light  Neck: Normal range of motion  Neck supple  No thyromegaly present  Cardiovascular: Normal rate, regular rhythm and normal heart sounds  Pulmonary/Chest: Effort normal and breath sounds normal  No respiratory distress  She has no wheezes  She has no rales  Abdominal: Soft  Bowel sounds are normal  She exhibits no distension  There is no tenderness  Musculoskeletal: Normal range of motion  She exhibits no edema  Neurological: She is alert and oriented to person, place, and time  Skin: Skin is warm and dry  Psychiatric: She has a normal mood and affect  Vitals reviewed        Labs:     Component      Latest Ref Rng & Units 9/25/2017   Free T4      0 82 - 1 77 ng/dL 1 76   TSH 3RD GENERATON      0 450 - 4 500 uIU/mL <0 005 (L)         Lab Results   Component Value Date     09/14/2016    K 4 1 09/14/2016     09/14/2016    CO2 25 09/14/2016    BUN 5 (L) 09/14/2016    CREATININE 0 67 09/14/2016    GLUCOSE 91 09/14/2016    CALCIUM 9 6 09/14/2016    AST 32 09/14/2016    ALT 44 (H) 09/14/2016    ALKPHOS 103 09/14/2016    PROT 6 5 09/14/2016    BILITOT 0 4 09/14/2016     THYROID ULTRASOUND     INDICATION: Nontoxic thyroid nodule      COMPARISON: Thyroid ultrasound 10/29/2016     TECHNIQUE:   Ultrasound of the thyroid was performed with a high frequency linear transducer in transverse and sagittal planes including volumetric imaging sweeps as well as traditional still imaging technique      FINDINGS:  Thyroid parenchyma is diffusely heterogeneous in echotexture      Right gland:  3 9 x 1 7 x 1 2 cm  No dominant nodules         Left gland:  4 5 x 1 8 x 1 4 cm  No dominant nodules           Isthmus: The isthmus is 0 2 cm in AP dimension           IMPRESSION:     Heterogeneous appearance of the thyroid gland without discrete nodule            Impression & Plan:    Problem List Items Addressed This Visit     Graves disease - Primary    Relevant Orders    T4, free    TSH, 3rd generation    Hyperthyroidism     Due to Graves  Check TSH and T4 as she did not check these after restating methimazole  Relevant Orders    T4, free    TSH, 3rd generation    Thyroid nodule     September 2017 US showed no distinct nodule  Will repeat US this year  Relevant Orders    US thyroid      Other Visit Diagnoses     Graves' disease              Orders Placed This Encounter   Procedures    US thyroid     Standing Status:   Future     Standing Expiration Date:   10/11/2019     Scheduling Instructions:      No prep required  Please bring your physician order, insurance cards, a form of photo ID and a list of your medications with you  Arrive 15 minutes prior to your appointment time in order to register  Order Specific Question:   Reason for Exam:     Answer:   thyroid nodules     Order Specific Question:   Is the patient pregnant? Answer:   No    T4, free    TSH, 3rd generation     This is a patient instruction: This test is non-fasting  Please drink two glasses of water morning of bloodwork  Discussed with the patient and all questioned fully answered   She will call me if any problems arise       Follow-up appointment in 6 months       Counseled patient on diagnostic results, prognosis, risk and benefit of treatment options, instruction for management, importance of treatment compliance, Risk  factor reduction and impressions      Ester Preciado [address]

## 2018-04-11 ENCOUNTER — OFFICE VISIT (OUTPATIENT)
Dept: ENDOCRINOLOGY | Facility: CLINIC | Age: 58
End: 2018-04-11
Payer: COMMERCIAL

## 2018-04-11 VITALS
HEART RATE: 70 BPM | DIASTOLIC BLOOD PRESSURE: 80 MMHG | HEIGHT: 68 IN | WEIGHT: 159 LBS | BODY MASS INDEX: 24.1 KG/M2 | SYSTOLIC BLOOD PRESSURE: 130 MMHG

## 2018-04-11 DIAGNOSIS — E05.00 GRAVES DISEASE: Primary | ICD-10-CM

## 2018-04-11 DIAGNOSIS — E04.1 THYROID NODULE: ICD-10-CM

## 2018-04-11 DIAGNOSIS — E05.90 HYPERTHYROIDISM: ICD-10-CM

## 2018-04-11 DIAGNOSIS — E05.00 GRAVES' DISEASE: ICD-10-CM

## 2018-04-11 PROCEDURE — 99203 OFFICE O/P NEW LOW 30 MIN: CPT | Performed by: NURSE PRACTITIONER

## 2018-04-12 ENCOUNTER — TELEPHONE (OUTPATIENT)
Dept: ENDOCRINOLOGY | Facility: CLINIC | Age: 58
End: 2018-04-12

## 2018-04-12 LAB
T4 FREE SERPL-MCNC: 1.14 NG/DL (ref 0.82–1.77)
TSH SERPL DL<=0.005 MIU/L-ACNC: 2.35 UIU/ML (ref 0.45–4.5)

## 2018-04-12 NOTE — TELEPHONE ENCOUNTER
----- Message from [place] sent at 4/12/2018  7:36 AM EDT -----  Please call the patient regarding her result  Labs within normal range  Continue methimazole at current dose

## 2018-05-22 ENCOUNTER — TELEPHONE (OUTPATIENT)
Dept: ENDOCRINOLOGY | Facility: CLINIC | Age: 58
End: 2018-05-22

## 2018-05-22 ENCOUNTER — DOCUMENTATION (OUTPATIENT)
Dept: ENDOCRINOLOGY | Facility: CLINIC | Age: 58
End: 2018-05-22

## 2018-05-22 NOTE — TELEPHONE ENCOUNTER
The letter that was done for the patient needs to be modified  She said it was returned to her stating that they need some clarification about the medications that she is taking  Please call her at [phone] and she will let you know exactly what she needs  Thank you

## 2018-05-23 ENCOUNTER — TELEPHONE (OUTPATIENT)
Dept: ENDOCRINOLOGY | Facility: CLINIC | Age: 58
End: 2018-05-23

## 2018-05-23 NOTE — TELEPHONE ENCOUNTER
Spoke with pt type new note stating what she needed that she is being treated for her Graves disease and is taking medication for this  As long as she continue medication she shouldn't have any symptoms and will be follow up with office regularly

## 2018-07-31 DIAGNOSIS — K21.9 GASTROESOPHAGEAL REFLUX DISEASE WITHOUT ESOPHAGITIS: Primary | ICD-10-CM

## 2018-07-31 RX ORDER — PANTOPRAZOLE SODIUM 40 MG/1
TABLET, DELAYED RELEASE ORAL
Qty: 90 TABLET | Refills: 2 | Status: SHIPPED | OUTPATIENT
Start: 2018-07-31 | End: 2019-04-29 | Stop reason: SDUPTHER

## 2018-10-11 ENCOUNTER — OFFICE VISIT (OUTPATIENT)
Dept: ENDOCRINOLOGY | Facility: CLINIC | Age: 58
End: 2018-10-11
Payer: COMMERCIAL

## 2018-10-11 VITALS
HEART RATE: 94 BPM | BODY MASS INDEX: 23.25 KG/M2 | SYSTOLIC BLOOD PRESSURE: 118 MMHG | HEIGHT: 68 IN | DIASTOLIC BLOOD PRESSURE: 90 MMHG | WEIGHT: 153.4 LBS

## 2018-10-11 DIAGNOSIS — R53.83 FATIGUE, UNSPECIFIED TYPE: ICD-10-CM

## 2018-10-11 DIAGNOSIS — E04.1 THYROID NODULE: ICD-10-CM

## 2018-10-11 DIAGNOSIS — E05.90 HYPERTHYROIDISM: ICD-10-CM

## 2018-10-11 DIAGNOSIS — E05.00 GRAVES DISEASE: Primary | ICD-10-CM

## 2018-10-11 PROCEDURE — 99214 OFFICE O/P EST MOD 30 MIN: CPT | Performed by: INTERNAL MEDICINE

## 2018-10-11 RX ORDER — LORATADINE 10 MG/1
10 TABLET ORAL DAILY PRN
COMMUNITY

## 2018-10-11 RX ORDER — METHIMAZOLE 5 MG/1
5 TABLET ORAL DAILY
Qty: 30 TABLET | Refills: 6 | Status: SHIPPED | OUTPATIENT
Start: 2018-10-11 | End: 2018-12-03 | Stop reason: SDUPTHER

## 2018-10-11 NOTE — ASSESSMENT & PLAN NOTE
TSH was normal in April, will repeat thyroid function test   If TSH still normal will try decreasing the dose of methimazole  Discussed with the patient that given that TSH was normal palpitations not likely related to thyroid dysfunction    If they continue despite a normal TSH she should follow up with her primary care

## 2018-10-11 NOTE — PROGRESS NOTES
Jessica Silver 63 y o  female MRN: [id number]    Encounter: [id number]      Assessment/Plan     Problem List Items Addressed This Visit     Graves disease - Primary     TSH was normal in April, will repeat thyroid function test   If TSH still normal will try decreasing the dose of methimazole  Discussed with the patient that given that TSH was normal palpitations not likely related to thyroid dysfunction  If they continue despite a normal TSH she should follow up with her primary care         Relevant Medications    methimazole (TAPAZOLE) 5 mg tablet    Other Relevant Orders    T4, free Lab Collect    Thyroid stimulating immunoglobulin Lab Collect    CBC and differential Lab Collect    TSH, 3rd generation Lab Collect    T3 Lab Collect    Comprehensive metabolic panel Lab Collect    Hyperthyroidism    Relevant Medications    methimazole (TAPAZOLE) 5 mg tablet    Other Relevant Orders    Comprehensive metabolic panel Lab Collect    Thyroid nodule    Relevant Medications    methimazole (TAPAZOLE) 5 mg tablet    Fatigue    Relevant Orders    CBC and differential Lab Collect    Vitamin D 25 hydroxy Lab Collect    Comprehensive metabolic panel Lab Collect        CC:   Hyperthyroidism and Graves disease    History of Present Illness      HPI:  60-year-old woman with history of hyperthyroidism secondary to Graves disease here for follow-up  She is currently on Methimazole 5 mg daily -denies any side effects including rashes, fever or sore throat  Does have a cold  Weight has been fairly stable  Does complain of fatigue, palpitations and constipation  No difficulty swallowing, breathing or pressure sensation      Review of Systems   Constitutional: Positive for fatigue  Negative for unexpected weight change  Cardiovascular: Positive for palpitations  Negative for leg swelling  Gastrointestinal: Positive for constipation  Negative for diarrhea, nausea and vomiting  Endocrine: Positive for heat intolerance  Negative for polydipsia and polyuria  Musculoskeletal: Negative for gait problem  Skin: Negative for color change, pallor, rash and wound  Psychiatric/Behavioral: Positive for sleep disturbance  All other systems reviewed and are negative  Historical Information   Past Medical History:   Diagnosis Date    Allergic rhinitis     Esophageal reflux     Graves' disease      Past Surgical History:   Procedure Laterality Date    NO PAST SURGERIES       Social History   History   Alcohol Use    Yes     History   Drug Use No     History   Smoking Status    Never Smoker   Smokeless Tobacco    Never Used     Family History:   Family History   Problem Relation Age of Onset    Thyroid disease unspecified Father     Thyroid disease unspecified Brother        Meds/Allergies   Current Outpatient Prescriptions   Medication Sig Dispense Refill    loratadine (CLARITIN) 10 mg tablet Take 10 mg by mouth daily      methimazole (TAPAZOLE) 5 mg tablet Take 1 tablet (5 mg total) by mouth daily 30 tablet 6    pantoprazole (PROTONIX) 40 mg tablet TAKE 1 TABLET DAILY 90 tablet 2     No current facility-administered medications for this visit  No Known Allergies    Objective   Vitals: Blood pressure 118/90, pulse 94, height 5' 7 5" (1 715 m), weight 69 6 kg (153 lb 6 4 oz)  Physical Exam   Constitutional: She is oriented to person, place, and time  She appears well-developed and well-nourished  No distress  HENT:   Head: Normocephalic and atraumatic  Mouth/Throat: No oropharyngeal exudate  Eyes: Conjunctivae and EOM are normal  No scleral icterus  Neck: Normal range of motion  Neck supple  Cardiovascular: Normal rate, regular rhythm and normal heart sounds  No murmur heard  Pulmonary/Chest: Effort normal and breath sounds normal  No respiratory distress  She has no wheezes  She has no rales  Abdominal: Soft  Bowel sounds are normal  She exhibits no distension  There is no tenderness   There is no rebound  Musculoskeletal: Normal range of motion  She exhibits no edema or deformity  Lymphadenopathy:     She has no cervical adenopathy  Neurological: She is alert and oriented to person, place, and time  Skin: Skin is warm and dry  No rash noted  No erythema  No pallor  Psychiatric: She has a normal mood and affect  Her behavior is normal  Judgment and thought content normal        The history was obtained from the review of the chart, patient  Lab Results:   Lab Results   Component Value Date/Time    Free t4 1 14 04/11/2018 10:07 AM     Imaging Studies:    US thyroid   Status: Final result   PACS Images     Show images for US thyroid   Order Report      Order Details   Study Result     THYROID ULTRASOUND     INDICATION: Nontoxic thyroid nodule      COMPARISON: Thyroid ultrasound 10/29/2016     TECHNIQUE:   Ultrasound of the thyroid was performed with a high frequency linear transducer in transverse and sagittal planes including volumetric imaging sweeps as well as traditional still imaging technique      FINDINGS:  Thyroid parenchyma is diffusely heterogeneous in echotexture      Right gland:  3 9 x 1 7 x 1 2 cm  No dominant nodules         Left gland:  4 5 x 1 8 x 1 4 cm  No dominant nodules           Isthmus: The isthmus is 0 2 cm in AP dimension           IMPRESSION:     Heterogeneous appearance of the thyroid gland without discrete nodule                I have personally reviewed pertinent reports  Portions of the record may have been created with voice recognition software  Occasional wrong word or "sound a like" substitutions may have occurred due to the inherent limitations of voice recognition software  Read the chart carefully and recognize, using context, where substitutions have occurred

## 2018-10-15 LAB
25(OH)D3+25(OH)D2 SERPL-MCNC: 29.9 NG/ML (ref 30–100)
ALBUMIN SERPL-MCNC: 4.7 G/DL (ref 3.5–5.5)
ALBUMIN/GLOB SERPL: 2.4 {RATIO} (ref 1.2–2.2)
ALP SERPL-CCNC: 71 IU/L (ref 39–117)
ALT SERPL-CCNC: 28 IU/L (ref 0–32)
AST SERPL-CCNC: 34 IU/L (ref 0–40)
BASOPHILS # BLD AUTO: 0 X10E3/UL (ref 0–0.2)
BASOPHILS NFR BLD AUTO: 0 %
BILIRUB SERPL-MCNC: 0.4 MG/DL (ref 0–1.2)
BUN SERPL-MCNC: 7 MG/DL (ref 6–24)
BUN/CREAT SERPL: 7 (ref 9–23)
CALCIUM SERPL-MCNC: 9.6 MG/DL (ref 8.7–10.2)
CHLORIDE SERPL-SCNC: 103 MMOL/L (ref 96–106)
CO2 SERPL-SCNC: 25 MMOL/L (ref 20–29)
CREAT SERPL-MCNC: 1.06 MG/DL (ref 0.57–1)
EOSINOPHIL # BLD AUTO: 0.1 X10E3/UL (ref 0–0.4)
EOSINOPHIL NFR BLD AUTO: 2 %
ERYTHROCYTE [DISTWIDTH] IN BLOOD BY AUTOMATED COUNT: 13.5 % (ref 12.3–15.4)
GLOBULIN SER-MCNC: 2 G/DL (ref 1.5–4.5)
GLUCOSE SERPL-MCNC: 94 MG/DL (ref 65–99)
HCT VFR BLD AUTO: 42.5 % (ref 34–46.6)
HGB BLD-MCNC: 14.4 G/DL (ref 11.1–15.9)
IMM GRANULOCYTES # BLD: 0 X10E3/UL (ref 0–0.1)
IMM GRANULOCYTES NFR BLD: 0 %
LYMPHOCYTES # BLD AUTO: 0.9 X10E3/UL (ref 0.7–3.1)
LYMPHOCYTES NFR BLD AUTO: 16 %
MCH RBC QN AUTO: 30.3 PG (ref 26.6–33)
MCHC RBC AUTO-ENTMCNC: 33.9 G/DL (ref 31.5–35.7)
MCV RBC AUTO: 90 FL (ref 79–97)
MONOCYTES # BLD AUTO: 0.9 X10E3/UL (ref 0.1–0.9)
MONOCYTES NFR BLD AUTO: 15 %
NEUTROPHILS # BLD AUTO: 3.9 X10E3/UL (ref 1.4–7)
NEUTROPHILS NFR BLD AUTO: 67 %
PLATELET # BLD AUTO: 210 X10E3/UL (ref 150–379)
POTASSIUM SERPL-SCNC: 4 MMOL/L (ref 3.5–5.2)
PROT SERPL-MCNC: 6.7 G/DL (ref 6–8.5)
RBC # BLD AUTO: 4.75 X10E6/UL (ref 3.77–5.28)
SL AMB EGFR AFRICAN AMERICAN: 67 ML/MIN/1.73
SL AMB EGFR NON AFRICAN AMERICAN: 58 ML/MIN/1.73
SODIUM SERPL-SCNC: 143 MMOL/L (ref 134–144)
T3 SERPL-MCNC: 88 NG/DL (ref 71–180)
T4 FREE SERPL-MCNC: 1.17 NG/DL (ref 0.82–1.77)
TSH SERPL DL<=0.005 MIU/L-ACNC: 1.53 UIU/ML (ref 0.45–4.5)
TSI SER-ACNC: <0.1 IU/L (ref 0–0.55)
WBC # BLD AUTO: 5.9 X10E3/UL (ref 3.4–10.8)

## 2018-10-16 NOTE — PROGRESS NOTES
Please call the patient regarding labs - thyroid function tests normal- decrease methimazole to 1/2 tab daily - low vitamin D - start vitamin D3 2000 iu daily - repeat tsh and free t4 in 2 months

## 2018-10-17 ENCOUNTER — TELEPHONE (OUTPATIENT)
Dept: ENDOCRINOLOGY | Facility: CLINIC | Age: 58
End: 2018-10-17

## 2018-10-17 DIAGNOSIS — E05.00 GRAVES DISEASE: Primary | ICD-10-CM

## 2018-10-17 NOTE — TELEPHONE ENCOUNTER
----- Message from Rebecca Alexandra MD sent at 10/16/2018  4:31 PM EDT -----  Please call the patient regarding labs - thyroid function tests normal- decrease methimazole to 1/2 tab daily - low vitamin D - start vitamin D3 2000 iu daily - repeat tsh and free t4 in 2 months

## 2018-10-25 ENCOUNTER — TRANSCRIBE ORDERS (OUTPATIENT)
Dept: FAMILY MEDICINE CLINIC | Facility: HOSPITAL | Age: 58
End: 2018-10-25

## 2018-10-25 DIAGNOSIS — Z12.11 ENCOUNTER FOR SCREENING FOR MALIGNANT NEOPLASM OF COLON: Primary | ICD-10-CM

## 2018-12-03 DIAGNOSIS — E05.00 GRAVES DISEASE: ICD-10-CM

## 2018-12-03 DIAGNOSIS — E05.90 HYPERTHYROIDISM: ICD-10-CM

## 2018-12-03 RX ORDER — METHIMAZOLE 5 MG/1
5 TABLET ORAL DAILY
Qty: 30 TABLET | Refills: 4 | Status: SHIPPED | OUTPATIENT
Start: 2018-12-03 | End: 2019-02-11 | Stop reason: SDUPTHER

## 2019-02-04 ENCOUNTER — TRANSCRIBE ORDERS (OUTPATIENT)
Dept: ADMINISTRATIVE | Facility: HOSPITAL | Age: 59
End: 2019-02-04

## 2019-02-04 DIAGNOSIS — Z12.39 SCREENING BREAST EXAMINATION: Primary | ICD-10-CM

## 2019-02-11 DIAGNOSIS — E05.90 HYPERTHYROIDISM: ICD-10-CM

## 2019-02-11 DIAGNOSIS — E05.00 GRAVES DISEASE: ICD-10-CM

## 2019-02-11 RX ORDER — METHIMAZOLE 5 MG/1
5 TABLET ORAL DAILY
Qty: 90 TABLET | Refills: 4 | Status: SHIPPED | OUTPATIENT
Start: 2019-02-11 | End: 2019-04-04 | Stop reason: SDUPTHER

## 2019-02-18 ENCOUNTER — HOSPITAL ENCOUNTER (OUTPATIENT)
Dept: MAMMOGRAPHY | Facility: CLINIC | Age: 59
Discharge: HOME | End: 2019-02-18
Payer: COMMERCIAL

## 2019-02-18 VITALS — BODY MASS INDEX: 23.19 KG/M2 | HEIGHT: 68 IN | WEIGHT: 153 LBS

## 2019-02-18 DIAGNOSIS — Z12.39 SCREENING BREAST EXAMINATION: ICD-10-CM

## 2019-02-18 PROCEDURE — 77067 SCR MAMMO BI INCL CAD: CPT

## 2019-02-18 PROCEDURE — 77063 BREAST TOMOSYNTHESIS BI: CPT

## 2019-04-04 ENCOUNTER — OFFICE VISIT (OUTPATIENT)
Dept: ENDOCRINOLOGY | Facility: CLINIC | Age: 59
End: 2019-04-04
Payer: COMMERCIAL

## 2019-04-04 VITALS
HEART RATE: 76 BPM | HEIGHT: 68 IN | SYSTOLIC BLOOD PRESSURE: 123 MMHG | WEIGHT: 154 LBS | DIASTOLIC BLOOD PRESSURE: 84 MMHG | BODY MASS INDEX: 23.34 KG/M2

## 2019-04-04 DIAGNOSIS — E05.90 HYPERTHYROIDISM: Primary | ICD-10-CM

## 2019-04-04 DIAGNOSIS — E55.9 VITAMIN D DEFICIENCY: ICD-10-CM

## 2019-04-04 DIAGNOSIS — E04.1 THYROID NODULE: ICD-10-CM

## 2019-04-04 DIAGNOSIS — E05.00 GRAVES DISEASE: ICD-10-CM

## 2019-04-04 PROCEDURE — 99214 OFFICE O/P EST MOD 30 MIN: CPT | Performed by: PHYSICIAN ASSISTANT

## 2019-04-04 RX ORDER — METHIMAZOLE 5 MG/1
2.5 TABLET ORAL DAILY
Qty: 90 TABLET | Refills: 4
Start: 2019-04-04 | End: 2019-06-27 | Stop reason: ALTCHOICE

## 2019-04-04 RX ORDER — ACETAMINOPHEN 160 MG
2000 TABLET,DISINTEGRATING ORAL DAILY
Qty: 30 CAPSULE | Refills: 5
Start: 2019-04-04

## 2019-04-04 RX ORDER — MULTIVITAMIN
2 TABLET ORAL DAILY
COMMUNITY
End: 2022-08-04 | Stop reason: ALTCHOICE

## 2019-04-05 LAB
T4 FREE SERPL-MCNC: 1.15 NG/DL (ref 0.82–1.77)
TSH SERPL DL<=0.005 MIU/L-ACNC: 1.52 UIU/ML (ref 0.45–4.5)

## 2019-04-11 ENCOUNTER — TELEPHONE (OUTPATIENT)
Dept: ENDOCRINOLOGY | Facility: CLINIC | Age: 59
End: 2019-04-11

## 2019-04-29 DIAGNOSIS — K21.9 GASTROESOPHAGEAL REFLUX DISEASE WITHOUT ESOPHAGITIS: ICD-10-CM

## 2019-04-29 RX ORDER — PANTOPRAZOLE SODIUM 40 MG/1
TABLET, DELAYED RELEASE ORAL
Qty: 90 TABLET | Refills: 2 | Status: SHIPPED | OUTPATIENT
Start: 2019-04-29 | End: 2020-01-24

## 2019-05-31 LAB
T4 FREE SERPL-MCNC: 1.25 NG/DL (ref 0.82–1.77)
TSH SERPL DL<=0.005 MIU/L-ACNC: 1.36 UIU/ML (ref 0.45–4.5)

## 2019-06-06 ENCOUNTER — TELEPHONE (OUTPATIENT)
Dept: ENDOCRINOLOGY | Facility: CLINIC | Age: 59
End: 2019-06-06

## 2019-06-06 DIAGNOSIS — E05.90 HYPERTHYROIDISM: Primary | ICD-10-CM

## 2019-06-06 NOTE — RESULT ENCOUNTER NOTE
Please call the patient regarding labs - tsh normal, decrease methimazole and take it every other day and repeat tsh and free t4 in 2 months

## 2019-06-27 ENCOUNTER — OFFICE VISIT (OUTPATIENT)
Dept: FAMILY MEDICINE CLINIC | Facility: HOSPITAL | Age: 59
End: 2019-06-27
Payer: COMMERCIAL

## 2019-06-27 VITALS
OXYGEN SATURATION: 96 % | HEIGHT: 67 IN | SYSTOLIC BLOOD PRESSURE: 110 MMHG | DIASTOLIC BLOOD PRESSURE: 76 MMHG | BODY MASS INDEX: 23.7 KG/M2 | HEART RATE: 69 BPM | WEIGHT: 151 LBS

## 2019-06-27 DIAGNOSIS — Z13.1 SCREENING FOR DIABETES MELLITUS: ICD-10-CM

## 2019-06-27 DIAGNOSIS — R40.0 DAYTIME SOMNOLENCE: ICD-10-CM

## 2019-06-27 DIAGNOSIS — Z13.6 SCREENING FOR CARDIOVASCULAR CONDITION: ICD-10-CM

## 2019-06-27 DIAGNOSIS — Z00.00 ANNUAL PHYSICAL EXAM: Primary | ICD-10-CM

## 2019-06-27 PROCEDURE — 99396 PREV VISIT EST AGE 40-64: CPT | Performed by: INTERNAL MEDICINE

## 2019-08-27 ENCOUNTER — TRANSCRIBE ORDERS (OUTPATIENT)
Dept: ADMINISTRATIVE | Facility: HOSPITAL | Age: 59
End: 2019-08-27

## 2019-08-27 DIAGNOSIS — G47.33 OBSTRUCTIVE SLEEP APNEA (ADULT) (PEDIATRIC): Primary | ICD-10-CM

## 2019-10-24 ENCOUNTER — OFFICE VISIT (OUTPATIENT)
Dept: ENDOCRINOLOGY | Facility: CLINIC | Age: 59
End: 2019-10-24
Payer: COMMERCIAL

## 2019-10-24 VITALS
HEART RATE: 75 BPM | BODY MASS INDEX: 23.22 KG/M2 | DIASTOLIC BLOOD PRESSURE: 80 MMHG | SYSTOLIC BLOOD PRESSURE: 116 MMHG | WEIGHT: 153.2 LBS | HEIGHT: 68 IN

## 2019-10-24 DIAGNOSIS — G47.9 SLEEP DISTURBANCE: ICD-10-CM

## 2019-10-24 DIAGNOSIS — E55.9 VITAMIN D DEFICIENCY: ICD-10-CM

## 2019-10-24 DIAGNOSIS — E05.00 GRAVES DISEASE: Primary | ICD-10-CM

## 2019-10-24 PROCEDURE — 99214 OFFICE O/P EST MOD 30 MIN: CPT | Performed by: INTERNAL MEDICINE
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
## 2019-10-24 NOTE — ASSESSMENT & PLAN NOTE
Will repeat thyroid function test today    She will need monitoring of thyroid function test moving forward

## 2019-10-25 LAB
25(OH)D3+25(OH)D2 SERPL-MCNC: 44.1 NG/ML (ref 30–100)
ALBUMIN SERPL-MCNC: 4.9 G/DL (ref 3.5–5.5)
ALBUMIN/GLOB SERPL: 2.3 {RATIO} (ref 1.2–2.2)
ALP SERPL-CCNC: 62 IU/L (ref 39–117)
ALT SERPL-CCNC: 22 IU/L (ref 0–32)
AST SERPL-CCNC: 29 IU/L (ref 0–40)
BILIRUB SERPL-MCNC: 0.4 MG/DL (ref 0–1.2)
BUN SERPL-MCNC: 9 MG/DL (ref 6–24)
BUN/CREAT SERPL: 10 (ref 9–23)
CALCIUM SERPL-MCNC: 9.9 MG/DL (ref 8.7–10.2)
CHLORIDE SERPL-SCNC: 103 MMOL/L (ref 96–106)
CHOLEST SERPL-MCNC: 296 MG/DL (ref 100–199)
CHOLEST/HDLC SERPL: 3.6 RATIO (ref 0–4.4)
CO2 SERPL-SCNC: 27 MMOL/L (ref 20–29)
CREAT SERPL-MCNC: 0.86 MG/DL (ref 0.57–1)
GLOBULIN SER-MCNC: 2.1 G/DL (ref 1.5–4.5)
GLUCOSE SERPL-MCNC: 91 MG/DL (ref 65–99)
HDLC SERPL-MCNC: 82 MG/DL
LDLC SERPL CALC-MCNC: 189 MG/DL (ref 0–99)
POTASSIUM SERPL-SCNC: 4.3 MMOL/L (ref 3.5–5.2)
PROT SERPL-MCNC: 7 G/DL (ref 6–8.5)
SL AMB EGFR AFRICAN AMERICAN: 86 ML/MIN/1.73
SL AMB EGFR NON AFRICAN AMERICAN: 75 ML/MIN/1.73
SL AMB VLDL CHOLESTEROL CALC: 25 MG/DL (ref 5–40)
SODIUM SERPL-SCNC: 144 MMOL/L (ref 134–144)
T4 FREE SERPL-MCNC: 1.21 NG/DL (ref 0.82–1.77)
TRIGL SERPL-MCNC: 127 MG/DL (ref 0–149)
TSH SERPL DL<=0.005 MIU/L-ACNC: 1.79 UIU/ML (ref 0.45–4.5)

## 2019-10-28 ENCOUNTER — TELEPHONE (OUTPATIENT)
Dept: ENDOCRINOLOGY | Facility: CLINIC | Age: 59
End: 2019-10-28

## 2019-10-28 DIAGNOSIS — E05.00 GRAVES DISEASE: Primary | ICD-10-CM

## 2019-10-28 NOTE — TELEPHONE ENCOUNTER
----- Message from Tia Navas MD sent at 10/28/2019 11:31 AM EDT -----  Please call the patient regarding labs - thyroid function test and vitamin D normal-repeat TSH and free T4 in 6 months

## 2019-10-28 NOTE — RESULT ENCOUNTER NOTE
Please call the patient regarding labs - thyroid function test and vitamin D normal-repeat TSH and free T4 in 6 months

## 2019-10-29 ENCOUNTER — OFFICE VISIT (OUTPATIENT)
Dept: SLEEP CENTER | Facility: HOSPITAL | Age: 59
End: 2019-10-29
Attending: INTERNAL MEDICINE
Payer: COMMERCIAL

## 2019-10-29 VITALS
BODY MASS INDEX: 22.58 KG/M2 | SYSTOLIC BLOOD PRESSURE: 100 MMHG | WEIGHT: 149 LBS | DIASTOLIC BLOOD PRESSURE: 68 MMHG | HEIGHT: 68 IN

## 2019-10-29 DIAGNOSIS — Z56.6 STRESS AT WORK: ICD-10-CM

## 2019-10-29 DIAGNOSIS — K21.9 GASTROESOPHAGEAL REFLUX DISEASE, ESOPHAGITIS PRESENCE NOT SPECIFIED: ICD-10-CM

## 2019-10-29 DIAGNOSIS — G47.09 OTHER INSOMNIA: Primary | ICD-10-CM

## 2019-10-29 DIAGNOSIS — G47.33 OBSTRUCTIVE SLEEP APNEA (ADULT) (PEDIATRIC): ICD-10-CM

## 2019-10-29 DIAGNOSIS — R00.2 PALPITATIONS: ICD-10-CM

## 2019-10-29 PROCEDURE — 99244 OFF/OP CNSLTJ NEW/EST MOD 40: CPT | Performed by: INTERNAL MEDICINE

## 2019-10-29 SDOH — HEALTH STABILITY - MENTAL HEALTH: OTHER PHYSICAL AND MENTAL STRAIN RELATED TO WORK: Z56.6

## 2019-10-29 NOTE — PROGRESS NOTES
Review of Systems      Genitourinary hot flashes at night   Cardiology palpitations/fluttering feeling in the chest   Gastrointestinal frequent heartburn/acid reflux   Neurology awaken with headache and balance problems   Constitutional excessive sweating at night   Integumentary none   Psychiatry none   Musculoskeletal none   Pulmonary difficulty breathing when lying flat    ENT none   Endocrine excessive thirst   Hematological blood donor

## 2019-10-29 NOTE — PATIENT INSTRUCTIONS
What you can do to improve your sleep: (Sleep Hygiene) Basic rules for a good night's sleep    Create a regular sleep schedule  This will help you form a sleep routine  Keep a record of your sleep patterns, and any sleeping problems you have  Bring the record to follow-up visits with healthcare providers  Avoid prolonged use of light-emitting screens before bedtime or watching TV in bed  Avoid forcing sleep  Do not take naps  Naps could make it hard for you to fall asleep at bedtime  Deal with your worries before bedtime  Keep your bedroom cool, quiet, and dark  Turn on white noise, such as a fan, to help you relax  Do not use your bed for any activity that will keep you awake  Do not read, exercise, eat, or watch TV in your bedroom  Get up if you do not fall asleep within 20 minutes  Move to another room and do something relaxing until you become sleepy  Limit caffeine, alcohol, nicotine and food to earlier in the day  Only drink caffeine in the morning  Do not drink alcohol within 6 hours of bedtime  Do not eat a heavy meal right before you go to bed  Avoid smoking, especially in the evening  Exercise regularly  Daily exercise will help you sleep better  Do not exercise within 4 hours of bedtime  Stimulus control therapy rules  1  Go to bed only when sleepy  2  Do not watch television, read, eat, or worry while in bed  Use bed only for sleep and sex  3  Get out of bed if unable to fall asleep within 20 minutes and go to another room  Return to bed only when sleepy  Repeat this step as many times as necessary throughout the night  4  Set an alarm clock to wake up at a fixed time each morning, including weekends  5  Do not take a nap during the day  Data from: [address] [place] [address], [address] Nonpharmacologic treatments of insomnia  J Clin Psychiat[facility] 3327; 53:37  Go to AASM website for more information: Sleepeducation  org     Recommended Reading:  Book by authors Zaida Sommer No More sleepless nights    What is NAEL? Obstructive sleep apnea is a common and serious sleep disorder that causes you to stop breathing during sleep  The airway repeatedly becomes blocked, limiting the amount of air that reaches your lungs  When this happens, you may snore loudly or making choking noises as you try to breathe  Your brain and body becomes oxygen deprived and you may wake up  This may happen a few times a night, or in more severe cases, several hundred times a night  Sleep apnea can make you wake up in the morning feeling tired or unrefreshed even though you have had a full night of sleep  During the day, you may feel fatigued, have difficulty concentrating or you may even unintentionally fall asleep  This is because your body is waking up numerous times throughout the night, even though you might not be conscious of each awakening  The lack of oxygen your body receives can have negative long-term consequences for your health  This includes:  High blood pressure  Heart disease  Irregular heart rhythms  Stroke  Pre-diabetes and diabetes  Depression    Testing  An objective evaluation of your sleep may be needed before your board certified sleep physician can make a diagnosis  Options include:   In-lab overnight sleep study  This type of sleep study requires you to stay overnight at a sleep center, in a bed that may resemble a hotel room  You will sleep with sensors hooked up to various parts of your body  These sensors record your brain waves, heartbeat, breathing and movement  An overnight sleep study also provides your doctor with the most complete information about your sleep   Learn more about an overnight sleep study      Home sleep apnea test  Some patients with high risk factors for obstructive sleep apnea and no other medical disorders may be candidates for a home sleep apnea test  The testing equipment differs in that it is less complicated than what is used in an overnight sleep study  As such, does not give all the data an in-lab will and if negative, may not mean you do not have the problem  Treatment for sleep apnea  includes using a continuous positive airway pressure (CPAP) machine to keep your airway open during sleep  A mask is placed over your nose and mouth, or just your nose  The mask is hooked to the CPAP machine that blows a gentle stream of air into the mask when you breathe  This helps keep your airway open so you can breathe more regularly  Extra oxygen may be given to you through the machine  You may be given a mouth device  It looks like a mouth guard or dental retainer and stops your tongue and mouth tissues from blocking your throat while you sleep  Surgery may be needed to remove extra tissues that block your mouth, throat, or nose  Manage sleep apnea:   Do not smoke  Nicotine and other chemicals in cigarettes and cigars can cause lung damage  Ask your healthcare provider for information if you currently smoke and need help to quit  E-cigarettes or smokeless tobacco still contain nicotine  Talk to your healthcare provider before you use these products  Do not drink alcohol or take sedative medicine before you go to sleep  Alcohol and sedatives can relax the muscles and tissues around your throat  This can block the airflow to your lungs  Maintain a healthy weight  Excess tissue around your throat may restrict your breathing  Ask your healthcare provider for information if you need to lose weight  Sleep on your side or use pillows designed to prevent sleep apnea  This prevents your tongue or other tissues from blocking your throat  You can also raise the head of your bed  Driving Safety  Refrain from driving when drowsy  Follow up with your healthcare provider as directed:  Write down your questions so you remember to ask them during your visits  Go to AASM website for more information: Sleepeducation  org     What is NAEL?    Obstructive sleep apnea is a common and serious sleep disorder that causes you to stop breathing during sleep  The airway repeatedly becomes blocked, limiting the amount of air that reaches your lungs  When this happens, you may snore loudly or making choking noises as you try to breathe  Your brain and body becomes oxygen deprived and you may wake up  This may happen a few times a night, or in more severe cases, several hundred times a night  Sleep apnea can make you wake up in the morning feeling tired or unrefreshed even though you have had a full night of sleep  During the day, you may feel fatigued, have difficulty concentrating or you may even unintentionally fall asleep  This is because your body is waking up numerous times throughout the night, even though you might not be conscious of each awakening  The lack of oxygen your body receives can have negative long-term consequences for your health  This includes:  High blood pressure  Heart disease  Irregular heart rhythms  Stroke  Pre-diabetes and diabetes  Depression    Testing  An objective evaluation of your sleep may be needed before your board certified sleep physician can make a diagnosis  Options include:   In-lab overnight sleep study  This type of sleep study requires you to stay overnight at a sleep center, in a bed that may resemble a hotel room  You will sleep with sensors hooked up to various parts of your body  These sensors record your brain waves, heartbeat, breathing and movement  An overnight sleep study also provides your doctor with the most complete information about your sleep  Learn more about an overnight sleep study      Home sleep apnea test  Some patients with high risk factors for obstructive sleep apnea and no other medical disorders may be candidates for a home sleep apnea test  The testing equipment differs in that it is less complicated than what is used in an overnight sleep study   As such, does not give all the data an in-lab will and if negative, may not mean you do not have the problem  Treatment for sleep apnea  includes using a continuous positive airway pressure (CPAP) machine to keep your airway open during sleep  A mask is placed over your nose and mouth, or just your nose  The mask is hooked to the CPAP machine that blows a gentle stream of air into the mask when you breathe  This helps keep your airway open so you can breathe more regularly  Extra oxygen may be given to you through the machine  You may be given a mouth device  It looks like a mouth guard or dental retainer and stops your tongue and mouth tissues from blocking your throat while you sleep  Surgery may be needed to remove extra tissues that block your mouth, throat, or nose  Manage sleep apnea:   Do not smoke  Nicotine and other chemicals in cigarettes and cigars can cause lung damage  Ask your healthcare provider for information if you currently smoke and need help to quit  E-cigarettes or smokeless tobacco still contain nicotine  Talk to your healthcare provider before you use these products  Do not drink alcohol or take sedative medicine before you go to sleep  Alcohol and sedatives can relax the muscles and tissues around your throat  This can block the airflow to your lungs  Maintain a healthy weight  Excess tissue around your throat may restrict your breathing  Ask your healthcare provider for information if you need to lose weight  Sleep on your side or use pillows designed to prevent sleep apnea  This prevents your tongue or other tissues from blocking your throat  You can also raise the head of your bed  Driving Safety  Refrain from driving when drowsy  Follow up with your healthcare provider as directed:  Write down your questions so you remember to ask them during your visits  Go to AAS website for more information: Sleepeducation  org         Nursing Support:  When: Monday through Friday 7A-5PM except holidays  Where: Our direct line is [phone]  If you are having a true emergency please call 911  In the event that the line is busy or it is after hours please leave a voice message and we will return your call  Please speak clearly, leaving your full name, birth date, best number to reach you and the reason for your call  Medication refills: We will need the name of the medication, the dosage, the ordering provider, whether you get a 30 or 90 day refill, and the pharmacy name and address  Medications will be ordered by the provider only  Nurses cannot call in prescriptions  Please allow 7 days for medication refills  Physician requested updates: If your provider requested that you call with an update after starting medication, please be ready to provide us the medication and dosage, what time you take your medication, the time you attempt to fall asleep, time you fall asleep, when you wake up, and what time you get out of bed  Sleep Study Results: We will contact you with sleep study results and/or next steps after the physician has reviewed your testing
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
## 2019-12-27 ENCOUNTER — HOSPITAL ENCOUNTER (OUTPATIENT)
Dept: ULTRASOUND IMAGING | Facility: CLINIC | Age: 59
Discharge: HOME | End: 2019-12-27
Payer: COMMERCIAL

## 2019-12-27 ENCOUNTER — OFFICE VISIT (OUTPATIENT)
Dept: FAMILY MEDICINE CLINIC | Facility: HOSPITAL | Age: 59
End: 2019-12-27
Payer: COMMERCIAL

## 2019-12-27 VITALS
HEIGHT: 68 IN | SYSTOLIC BLOOD PRESSURE: 110 MMHG | OXYGEN SATURATION: 98 % | WEIGHT: 150 LBS | HEART RATE: 84 BPM | BODY MASS INDEX: 22.73 KG/M2 | DIASTOLIC BLOOD PRESSURE: 88 MMHG

## 2019-12-27 DIAGNOSIS — R10.11 RUQ PAIN: ICD-10-CM

## 2019-12-27 DIAGNOSIS — R10.9 RIGHT FLANK PAIN: Primary | ICD-10-CM

## 2019-12-27 DIAGNOSIS — R10.11 RUQ PAIN: Primary | ICD-10-CM

## 2019-12-27 PROCEDURE — 3008F BODY MASS INDEX DOCD: CPT | Performed by: INTERNAL MEDICINE

## 2019-12-27 PROCEDURE — 76705 ECHO EXAM OF ABDOMEN: CPT

## 2019-12-27 PROCEDURE — 99213 OFFICE O/P EST LOW 20 MIN: CPT | Performed by: INTERNAL MEDICINE

## 2019-12-27 PROCEDURE — 4004F PT TOBACCO SCREEN RCVD TLK: CPT | Performed by: INTERNAL MEDICINE

## 2019-12-27 RX ORDER — OMEGA-3-ACID ETHYL ESTERS 1 G/1
2 CAPSULE, LIQUID FILLED ORAL 2 TIMES DAILY
COMMUNITY
End: 2020-09-02 | Stop reason: ALTCHOICE

## 2019-12-27 NOTE — PROGRESS NOTES
Assessment/Plan:             Problem List Items Addressed This Visit        Other    RUQ pain - Primary    Relevant Orders    CBC and differential    Comprehensive metabolic panel    Amylase    Lipase    US right upper quadrant            Subjective:      Patient ID: Víctor Novak is a 63 y o  female    1  ruq- ;ateral pain- noted in  past 2-3 weeks - doing spin class noted it- still there after holding on class for 5 days   no burning with urination- no change in color or consistency of stools- mom and grandmother had gb issues and ulcer issues in family  The following portions of the patient's history were reviewed and updated as appropriate: allergies, current medications and problem list      Review of Systems   Constitutional: Negative for chills, fatigue and fever  Gastrointestinal: Positive for abdominal pain  Negative for constipation and diarrhea  Looser stools today and felt dizzy   Genitourinary: Negative for difficulty urinating  All other systems reviewed and are negative  Objective:      Current Outpatient Medications:     Cholecalciferol (VITAMIN D3) 2000 units capsule, Take 1 capsule (2,000 Units total) by mouth daily, Disp: 30 capsule, Rfl: 5    loratadine (CLARITIN) 10 mg tablet, Take 10 mg by mouth daily, Disp: , Rfl:     Multiple Vitamin (MULTIVITAMIN) tablet, Take 1 tablet by mouth daily, Disp: , Rfl:     omega-3-acid ethyl esters (LOVAZA) 1 g capsule, Take 2 g by mouth 2 (two) times a day, Disp: , Rfl:     pantoprazole (PROTONIX) 40 mg tablet, TAKE 1 TABLET DAILY, Disp: 90 tablet, Rfl: 2    Blood pressure 110/88, pulse 84, height 5' 8" (1 727 m), weight 68 kg (150 lb), SpO2 98 %  Physical Exam   Constitutional: She is oriented to person, place, and time  She appears well-developed and well-nourished  No distress  HENT:   Head: Normocephalic     Right Ear: External ear normal    Left Ear: External ear normal    Mouth/Throat: Oropharynx is clear and moist  Eyes: Right eye exhibits no discharge  Left eye exhibits no discharge  No scleral icterus  Cardiovascular: Normal rate and regular rhythm  Pulmonary/Chest: Effort normal and breath sounds normal  No stridor  No respiratory distress  Abdominal: Soft  There is tenderness  ruq   Musculoskeletal: She exhibits no edema, tenderness or deformity  Neurological: She is alert and oriented to person, place, and time  No cranial nerve deficit  Coordination normal    Skin: No erythema  Nursing note and vitals reviewed

## 2019-12-28 LAB
ALBUMIN SERPL-MCNC: 4.9 G/DL (ref 3.5–5.5)
ALBUMIN/GLOB SERPL: 2.6 {RATIO} (ref 1.2–2.2)
ALP SERPL-CCNC: 61 IU/L (ref 39–117)
ALT SERPL-CCNC: 21 IU/L (ref 0–32)
AMYLASE SERPL-CCNC: 25 U/L (ref 31–124)
AST SERPL-CCNC: 23 IU/L (ref 0–40)
BASOPHILS # BLD AUTO: 0 X10E3/UL (ref 0–0.2)
BASOPHILS NFR BLD AUTO: 0 %
BILIRUB SERPL-MCNC: 0.5 MG/DL (ref 0–1.2)
BUN SERPL-MCNC: 9 MG/DL (ref 6–24)
BUN/CREAT SERPL: 10 (ref 9–23)
CALCIUM SERPL-MCNC: 10.2 MG/DL (ref 8.7–10.2)
CHLORIDE SERPL-SCNC: 100 MMOL/L (ref 96–106)
CO2 SERPL-SCNC: 24 MMOL/L (ref 20–29)
CREAT SERPL-MCNC: 0.9 MG/DL (ref 0.57–1)
EOSINOPHIL # BLD AUTO: 0 X10E3/UL (ref 0–0.4)
EOSINOPHIL NFR BLD AUTO: 1 %
ERYTHROCYTE [DISTWIDTH] IN BLOOD BY AUTOMATED COUNT: 14.5 % (ref 12.3–15.4)
GLOBULIN SER-MCNC: 1.9 G/DL (ref 1.5–4.5)
GLUCOSE SERPL-MCNC: 111 MG/DL (ref 65–99)
HCT VFR BLD AUTO: 43.8 % (ref 34–46.6)
HGB BLD-MCNC: 14.5 G/DL (ref 11.1–15.9)
IMM GRANULOCYTES # BLD: 0 X10E3/UL (ref 0–0.1)
IMM GRANULOCYTES NFR BLD: 0 %
LIPASE SERPL-CCNC: 32 U/L (ref 14–72)
LYMPHOCYTES # BLD AUTO: 1.9 X10E3/UL (ref 0.7–3.1)
LYMPHOCYTES NFR BLD AUTO: 25 %
MCH RBC QN AUTO: 29.5 PG (ref 26.6–33)
MCHC RBC AUTO-ENTMCNC: 33.1 G/DL (ref 31.5–35.7)
MCV RBC AUTO: 89 FL (ref 79–97)
MONOCYTES # BLD AUTO: 0.7 X10E3/UL (ref 0.1–0.9)
MONOCYTES NFR BLD AUTO: 9 %
NEUTROPHILS # BLD AUTO: 5 X10E3/UL (ref 1.4–7)
NEUTROPHILS NFR BLD AUTO: 65 %
PLATELET # BLD AUTO: 262 X10E3/UL (ref 150–450)
POTASSIUM SERPL-SCNC: 4 MMOL/L (ref 3.5–5.2)
PROT SERPL-MCNC: 6.8 G/DL (ref 6–8.5)
RBC # BLD AUTO: 4.92 X10E6/UL (ref 3.77–5.28)
SL AMB EGFR AFRICAN AMERICAN: 82 ML/MIN/1.73
SL AMB EGFR NON AFRICAN AMERICAN: 71 ML/MIN/1.73
SODIUM SERPL-SCNC: 140 MMOL/L (ref 134–144)
WBC # BLD AUTO: 7.7 X10E3/UL (ref 3.4–10.8)

## 2020-01-03 ENCOUNTER — HOSPITAL ENCOUNTER (OUTPATIENT)
Dept: CT IMAGING | Facility: HOSPITAL | Age: 60
Discharge: HOME | End: 2020-01-03
Attending: INTERNAL MEDICINE
Payer: COMMERCIAL

## 2020-01-03 DIAGNOSIS — R10.9 RIGHT FLANK PAIN: ICD-10-CM

## 2020-01-03 PROCEDURE — 74177 CT ABD & PELVIS W/CONTRAST: CPT

## 2020-01-03 RX ADMIN — IOHEXOL 100 ML: 350 INJECTION, SOLUTION INTRAVENOUS at 13:58

## 2020-01-06 ENCOUNTER — TELEPHONE (OUTPATIENT)
Dept: FAMILY MEDICINE CLINIC | Facility: HOSPITAL | Age: 60
End: 2020-01-06

## 2020-01-06 DIAGNOSIS — R10.11 RUQ PAIN: Primary | ICD-10-CM

## 2020-01-06 NOTE — TELEPHONE ENCOUNTER
Rosendocatalina Millsn at Radiology [address] it has not been read yet pt is aware DD

## 2020-01-06 NOTE — TELEPHONE ENCOUNTER
Can you please address pt called she would like results she stated that she is still not feeling well , she doesn't know what is wrong but something is not right

## 2020-01-06 NOTE — TELEPHONE ENCOUNTER
Discussed with patient- still having pain up to 7/10 this am then ongoing throughout day  Not affected if eating- alternating constipated and looser stools- no cramping  Feels that it generates under ribs and has some spasms- just under ribs- will refer  GI- had been on protonix  Refer to gi - if  Not improving may consider to see pain management and have then do a local block     Will also order urine dipstick with reflex to cx Statement Selected

## 2020-01-24 DIAGNOSIS — K21.9 GASTROESOPHAGEAL REFLUX DISEASE WITHOUT ESOPHAGITIS: ICD-10-CM

## 2020-01-24 RX ORDER — PANTOPRAZOLE SODIUM 40 MG/1
TABLET, DELAYED RELEASE ORAL
Qty: 90 TABLET | Refills: 0 | Status: SHIPPED | OUTPATIENT
Start: 2020-01-24 | End: 2020-04-23

## 2020-01-27 ENCOUNTER — TRANSCRIBE ORDERS (OUTPATIENT)
Dept: ADMINISTRATIVE | Facility: HOSPITAL | Age: 60
End: 2020-01-27

## 2020-01-27 DIAGNOSIS — Z12.31 SCREENING MAMMOGRAM, ENCOUNTER FOR: Primary | ICD-10-CM

## 2020-02-19 ENCOUNTER — HOSPITAL ENCOUNTER (OUTPATIENT)
Dept: MAMMOGRAPHY | Facility: CLINIC | Age: 60
Discharge: HOME | End: 2020-02-19
Payer: COMMERCIAL

## 2020-02-19 VITALS — WEIGHT: 150 LBS | BODY MASS INDEX: 22.73 KG/M2 | HEIGHT: 68 IN

## 2020-02-19 DIAGNOSIS — Z12.31 SCREENING MAMMOGRAM, ENCOUNTER FOR: ICD-10-CM

## 2020-02-19 PROCEDURE — 77067 SCR MAMMO BI INCL CAD: CPT

## 2020-02-19 PROCEDURE — 77063 BREAST TOMOSYNTHESIS BI: CPT

## 2020-03-14 ENCOUNTER — OFFICE VISIT (OUTPATIENT)
Dept: URGENT CARE | Facility: CLINIC | Age: 60
End: 2020-03-14
Payer: COMMERCIAL

## 2020-03-14 VITALS
SYSTOLIC BLOOD PRESSURE: 120 MMHG | BODY MASS INDEX: 23.01 KG/M2 | WEIGHT: 151.8 LBS | HEART RATE: 86 BPM | OXYGEN SATURATION: 98 % | HEIGHT: 68 IN | RESPIRATION RATE: 16 BRPM | TEMPERATURE: 97.3 F | DIASTOLIC BLOOD PRESSURE: 80 MMHG

## 2020-03-14 DIAGNOSIS — J30.9 ALLERGIC RHINITIS, UNSPECIFIED SEASONALITY, UNSPECIFIED TRIGGER: Primary | ICD-10-CM

## 2020-03-14 DIAGNOSIS — J02.9 SORE THROAT: ICD-10-CM

## 2020-03-14 LAB — S PYO AG THROAT QL: NEGATIVE

## 2020-03-14 PROCEDURE — 87880 STREP A ASSAY W/OPTIC: CPT | Performed by: PHYSICIAN ASSISTANT

## 2020-03-14 PROCEDURE — 99213 OFFICE O/P EST LOW 20 MIN: CPT | Performed by: PHYSICIAN ASSISTANT

## 2020-03-14 RX ORDER — PREDNISONE 20 MG/1
TABLET ORAL
COMMUNITY
Start: 2020-03-03 | End: 2020-06-29 | Stop reason: ALTCHOICE

## 2020-03-14 NOTE — PATIENT INSTRUCTIONS
Allergic Rhinitis   AMBULATORY CARE:   Allergic rhinitis , or hay fever, is swelling of the inside of your nose  The swelling is a reaction to allergens in the air  An allergen can be anything that causes an allergic reaction  Allergies to weeds, grass, trees, or mold often cause seasonal allergic rhinitis  Indoor dust mites, cockroaches, pet dander, or mold can also cause allergic rhinitis  Common signs and symptoms include the following:   · Sneezing    · Nasal congestion    · Runny nose    · Itchy nose, eyes, or mouth    · Red, watery eyes    · Postnasal drip (nasal drainage down the back of your throat)    · Cough or frequent throat clearing    · Feeling tired or lethargic    · Dark circles under your eyes  Call 911 for the following:   · You have chest pain or shortness of breath  Seek care immediately if:   · You have severe pain  · You cough up blood  Contact your healthcare provider if:   · You have a fever  · You have ear or sinus pain, or a headache  · Your symptoms get worse, even after treatment  · You have yellow, green, brown, or bloody mucus coming from your nose  · Your nose is bleeding or you have pain inside your nose  · You have trouble sleeping because of your symptoms  · You have questions or concerns about your condition or care  Treatment:   · Antihistamines  help reduce itching, sneezing, and a runny nose  Some antihistamines can make you sleepy  · Nasal steroids  help decrease inflammation in your nose  · Decongestants  help clear your stuffy nose  · Immunotherapy  may be needed if your symptoms are severe or other treatments do not work  Immunotherapy is used to inject an allergen into your skin  At first, the therapy contains tiny amounts of the allergen  Your healthcare provider will slowly increase the amount of allergen  This may help your body be less sensitive to the allergen and stop reacting to it   You may need immunotherapy for weeks or longer  Manage allergic rhinitis:  The best way to manage allergic rhinitis is to avoid allergens that can trigger your symptoms  Any of the following may help decrease your symptoms:  · Rinse your nose and sinuses  with a salt water solution or use a salt water nasal spray  This will help thin the mucus in your nose and rinse away pollen and dirt  It will also help reduce swelling so you can breathe normally  Ask your healthcare provider how often to rinse your nose  · Reduce exposure to dust mites  Wash sheets and towels in hot water every week  Cover your pillows and mattresses with allergen-free covers  Limit the number of stuffed animals and soft toys your child has  Wash your child's toys in hot water regularly  Vacuum weekly and use a vacuum  with an air filter  If possible, get rid of carpets and curtains  These collect dust and dust mites  · Reduce exposure to pollen  Keep windows and doors closed in your house and car  Stay inside when air pollution or the pollen count is high  Run your air conditioner on recycle, and change air filters often  Shower and wash your hair before bed every night to rinse away pollen  · Reduce exposure to pet dander  If possible, do not keep cats, dogs, birds, or other pets  If you do keep pets in your home, keep them out of bedrooms and carpeted rooms  Bathe them often  · Reduce exposure to mold  Do not spend time in basements  Choose artificial plants instead of live plants  Keep your home's humidity at less than 45%  Do not have ponds or standing water in your home or yard  · Do not smoke  Avoid others who smoke  Ask your healthcare provider for information if you currently smoke and need help to quit  Follow up with your healthcare provider as directed:  Write down your questions so you remember to ask them during your visits     © 2017 [employer] Information is for End User's use only and may not be sold, redistributed or otherwise used for commercial purposes  All illustrations and images included in CareNotes® are the copyrighted property of A D A M , Inc  or [employer]  The above information is an  only  It is not intended as medical advice for individual conditions or treatments  Talk to your doctor, nurse or pharmacist before following any medical regimen to see if it is safe and effective for you
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
## 2020-03-16 LAB — B-HEM STREP SPEC QL CULT: NEGATIVE

## 2020-04-23 DIAGNOSIS — K21.9 GASTROESOPHAGEAL REFLUX DISEASE WITHOUT ESOPHAGITIS: ICD-10-CM

## 2020-04-23 RX ORDER — PANTOPRAZOLE SODIUM 40 MG/1
TABLET, DELAYED RELEASE ORAL
Qty: 90 TABLET | Refills: 3 | Status: SHIPPED | OUTPATIENT
Start: 2020-04-23 | End: 2021-04-19 | Stop reason: SDUPTHER

## 2020-06-29 ENCOUNTER — OFFICE VISIT (OUTPATIENT)
Dept: FAMILY MEDICINE CLINIC | Facility: HOSPITAL | Age: 60
End: 2020-06-29
Payer: COMMERCIAL

## 2020-06-29 VITALS
BODY MASS INDEX: 23.04 KG/M2 | SYSTOLIC BLOOD PRESSURE: 108 MMHG | RESPIRATION RATE: 14 BRPM | HEART RATE: 68 BPM | TEMPERATURE: 97.9 F | DIASTOLIC BLOOD PRESSURE: 62 MMHG | WEIGHT: 152 LBS | HEIGHT: 68 IN

## 2020-06-29 DIAGNOSIS — Z13.1 SCREENING FOR DIABETES MELLITUS: ICD-10-CM

## 2020-06-29 DIAGNOSIS — Z13.6 SCREENING FOR CARDIOVASCULAR CONDITION: ICD-10-CM

## 2020-06-29 DIAGNOSIS — E05.00 GRAVES DISEASE: ICD-10-CM

## 2020-06-29 DIAGNOSIS — Z00.00 ANNUAL PHYSICAL EXAM: Primary | ICD-10-CM

## 2020-06-29 PROBLEM — R10.11 RUQ PAIN: Status: RESOLVED | Noted: 2019-12-27 | Resolved: 2020-06-29

## 2020-06-29 PROCEDURE — 99396 PREV VISIT EST AGE 40-64: CPT | Performed by: INTERNAL MEDICINE

## 2020-06-29 PROCEDURE — 3008F BODY MASS INDEX DOCD: CPT | Performed by: INTERNAL MEDICINE

## 2020-06-30 ENCOUNTER — TELEPHONE (OUTPATIENT)
Dept: ADMINISTRATIVE | Facility: OTHER | Age: 60
End: 2020-06-30

## 2020-06-30 NOTE — LETTER
Procedure Request Form: Cervical Cancer Screening      Date Requested: 20  Patient: Robert Chain  Patient : 1960   Referring Provider: Eden Allred, MD        Date of Procedure ______________________________       The above patient has informed us that they have completed their   most recent Cervical Cancer Screening at your facility  Please complete   this form and attach all corresponding procedure reports/results  Comments __________________________________________________________  ____________________________________________________________________  ____________________________________________________________________  ____________________________________________________________________    Facility Completing Procedure _________________________________________    Form Completed By (print name) _______________________________________      Signature __________________________________________________________      These reports are needed for  compliance    Please fax this completed form and a copy of the procedure report to our office located at [place] [address] as soon as possible to [phone] ludy Brower: Phone [phone]    We thank you for your assistance in treating our mutual patient

## 2020-06-30 NOTE — TELEPHONE ENCOUNTER
----- Message from Caitlyn Hilton sent at 6/29/2020  8:38 AM EDT -----  Regarding: cervical cancer screening  Contact: [phone]  06/29/20 8:39 AM    Hello, our patient Aleena Trevino has had cervical cancer screening completed/performed  Please assist in updating the patient chart by contacting  GYN--SINCERE Berg, phone [phone]  The date of service is 1/15/20  Kindly update HM also      Thank you,  Ralph Lee

## 2020-06-30 NOTE — TELEPHONE ENCOUNTER
Upon review of the In Basket request and the patient's chart, initial outreach has been made via fax, please see Contacts section for details  A second outreach attempt will be made within 5 business days      Thank you  Tania Coulter

## 2020-07-03 LAB
ALBUMIN SERPL-MCNC: 4.5 G/DL (ref 3.8–4.9)
ALBUMIN/GLOB SERPL: 2.3 {RATIO} (ref 1.2–2.2)
ALP SERPL-CCNC: 61 IU/L (ref 39–117)
ALT SERPL-CCNC: 27 IU/L (ref 0–32)
AST SERPL-CCNC: 25 IU/L (ref 0–40)
BILIRUB SERPL-MCNC: 0.6 MG/DL (ref 0–1.2)
BUN SERPL-MCNC: 10 MG/DL (ref 6–24)
BUN/CREAT SERPL: 12 (ref 9–23)
CALCIUM SERPL-MCNC: 9.7 MG/DL (ref 8.7–10.2)
CHLORIDE SERPL-SCNC: 100 MMOL/L (ref 96–106)
CHOLEST SERPL-MCNC: 277 MG/DL (ref 100–199)
CHOLEST/HDLC SERPL: 3.3 RATIO (ref 0–4.4)
CO2 SERPL-SCNC: 26 MMOL/L (ref 20–29)
CREAT SERPL-MCNC: 0.81 MG/DL (ref 0.57–1)
GLOBULIN SER-MCNC: 2 G/DL (ref 1.5–4.5)
GLUCOSE SERPL-MCNC: 94 MG/DL (ref 65–99)
HDLC SERPL-MCNC: 85 MG/DL
LDLC SERPL CALC-MCNC: 160 MG/DL (ref 0–99)
POTASSIUM SERPL-SCNC: 4.2 MMOL/L (ref 3.5–5.2)
PROT SERPL-MCNC: 6.5 G/DL (ref 6–8.5)
SL AMB EGFR AFRICAN AMERICAN: 93 ML/MIN/1.73
SL AMB EGFR NON AFRICAN AMERICAN: 80 ML/MIN/1.73
SL AMB VLDL CHOLESTEROL CALC: 32 MG/DL (ref 5–40)
SODIUM SERPL-SCNC: 141 MMOL/L (ref 134–144)
TRIGL SERPL-MCNC: 159 MG/DL (ref 0–149)

## 2020-07-06 NOTE — TELEPHONE ENCOUNTER
Upon review of the In Basket request we were able to locate, review, and update the patient chart as requested for Pap Smear (HPV) aka Cervical Cancer Screening  Any additional questions or concerns should be emailed to the Practice Liaisons via [email or web address]  [email or web address] email, please do not reply via In Basket      Thank you  Hilario Badillo

## 2020-07-07 ENCOUNTER — TELEPHONE (OUTPATIENT)
Dept: FAMILY MEDICINE CLINIC | Facility: HOSPITAL | Age: 60
End: 2020-07-07

## 2020-07-07 NOTE — TELEPHONE ENCOUNTER
LM for pt to call back for results DD    ----- Message from Francis Medina MD sent at 7/3/2020 12:59 PM EDT -----  Call patient: Gay Sow normal blood sugar, electrolytes and renal fxn, cholesterol is high but she should watch dietary fat intake, continue exercising, she doesn't need to take medication

## 2020-09-02 ENCOUNTER — OFFICE VISIT (OUTPATIENT)
Dept: ENDOCRINOLOGY | Facility: CLINIC | Age: 60
End: 2020-09-02
Payer: COMMERCIAL

## 2020-09-02 VITALS
WEIGHT: 158.6 LBS | TEMPERATURE: 97.8 F | DIASTOLIC BLOOD PRESSURE: 70 MMHG | BODY MASS INDEX: 24.04 KG/M2 | HEIGHT: 68 IN | SYSTOLIC BLOOD PRESSURE: 110 MMHG | HEART RATE: 62 BPM

## 2020-09-02 DIAGNOSIS — E05.00 GRAVES DISEASE: Primary | ICD-10-CM

## 2020-09-02 DIAGNOSIS — E55.9 VITAMIN D DEFICIENCY: ICD-10-CM

## 2020-09-02 PROCEDURE — 1036F TOBACCO NON-USER: CPT | Performed by: PHYSICIAN ASSISTANT

## 2020-09-02 PROCEDURE — 99214 OFFICE O/P EST MOD 30 MIN: CPT | Performed by: PHYSICIAN ASSISTANT
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
## 2020-09-09 NOTE — ASSESSMENT & PLAN NOTE
Currently in remission  She remains off methimazole for over a year  Discussed possibility of recurrent hyperthyroidism  Repeat TSH/Free T4 now  If this testing is normal, she will plan to follow up with family physician for continued monitoring of thyroid function on a routine basis or ASAP if symptoms of hyperthyroidism

## 2020-09-15 LAB
T4 FREE SERPL-MCNC: 1.23 NG/DL (ref 0.82–1.77)
TSH SERPL DL<=0.005 MIU/L-ACNC: 0.79 UIU/ML (ref 0.45–4.5)

## 2020-09-17 DIAGNOSIS — E05.90 HYPERTHYROIDISM: Primary | ICD-10-CM

## 2020-10-26 ENCOUNTER — HOSPITAL ENCOUNTER (OUTPATIENT)
Dept: RADIOLOGY | Facility: HOSPITAL | Age: 60
Discharge: HOME | End: 2020-10-26
Attending: INTERNAL MEDICINE
Payer: COMMERCIAL

## 2020-10-26 ENCOUNTER — OFFICE VISIT (OUTPATIENT)
Dept: FAMILY MEDICINE CLINIC | Facility: HOSPITAL | Age: 60
End: 2020-10-26
Payer: COMMERCIAL

## 2020-10-26 VITALS
WEIGHT: 155 LBS | DIASTOLIC BLOOD PRESSURE: 62 MMHG | RESPIRATION RATE: 14 BRPM | TEMPERATURE: 96.9 F | HEIGHT: 68 IN | BODY MASS INDEX: 23.49 KG/M2 | HEART RATE: 60 BPM | SYSTOLIC BLOOD PRESSURE: 104 MMHG

## 2020-10-26 DIAGNOSIS — M25.522 LEFT ELBOW PAIN: ICD-10-CM

## 2020-10-26 DIAGNOSIS — M79.671 FOOT PAIN, RIGHT: ICD-10-CM

## 2020-10-26 DIAGNOSIS — M79.671 FOOT PAIN, RIGHT: Primary | ICD-10-CM

## 2020-10-26 DIAGNOSIS — E05.00 GRAVES DISEASE: ICD-10-CM

## 2020-10-26 PROCEDURE — 73070 X-RAY EXAM OF ELBOW: CPT

## 2020-10-26 PROCEDURE — 3008F BODY MASS INDEX DOCD: CPT | Performed by: INTERNAL MEDICINE

## 2020-10-26 PROCEDURE — 99214 OFFICE O/P EST MOD 30 MIN: CPT | Performed by: INTERNAL MEDICINE

## 2020-10-26 PROCEDURE — 73630 X-RAY EXAM OF FOOT: CPT

## 2020-11-09 ENCOUNTER — EVALUATION (OUTPATIENT)
Dept: PHYSICAL THERAPY | Facility: CLINIC | Age: 60
End: 2020-11-09
Payer: COMMERCIAL

## 2020-11-09 DIAGNOSIS — S93.401D SPRAIN OF LIGAMENT OF RIGHT ANKLE, SUBSEQUENT ENCOUNTER: Primary | ICD-10-CM

## 2020-11-09 DIAGNOSIS — M79.671 FOOT PAIN, RIGHT: ICD-10-CM

## 2020-11-09 PROCEDURE — 97140 MANUAL THERAPY 1/> REGIONS: CPT | Performed by: PHYSICAL THERAPIST

## 2020-11-09 PROCEDURE — 97161 PT EVAL LOW COMPLEX 20 MIN: CPT | Performed by: PHYSICAL THERAPIST

## 2020-11-11 ENCOUNTER — OFFICE VISIT (OUTPATIENT)
Dept: PHYSICAL THERAPY | Facility: CLINIC | Age: 60
End: 2020-11-11
Payer: COMMERCIAL

## 2020-11-11 DIAGNOSIS — S93.401D SPRAIN OF LIGAMENT OF RIGHT ANKLE, SUBSEQUENT ENCOUNTER: Primary | ICD-10-CM

## 2020-11-11 DIAGNOSIS — M79.671 FOOT PAIN, RIGHT: ICD-10-CM

## 2020-11-11 PROCEDURE — 97140 MANUAL THERAPY 1/> REGIONS: CPT | Performed by: PHYSICAL THERAPIST

## 2020-11-11 PROCEDURE — 97110 THERAPEUTIC EXERCISES: CPT | Performed by: PHYSICAL THERAPIST

## 2020-11-11 PROCEDURE — 97112 NEUROMUSCULAR REEDUCATION: CPT | Performed by: PHYSICAL THERAPIST

## 2020-11-16 ENCOUNTER — OFFICE VISIT (OUTPATIENT)
Dept: PHYSICAL THERAPY | Facility: CLINIC | Age: 60
End: 2020-11-16
Payer: COMMERCIAL

## 2020-11-16 DIAGNOSIS — S93.401D SPRAIN OF LIGAMENT OF RIGHT ANKLE, SUBSEQUENT ENCOUNTER: Primary | ICD-10-CM

## 2020-11-16 DIAGNOSIS — M79.671 FOOT PAIN, RIGHT: ICD-10-CM

## 2020-11-16 PROCEDURE — 97112 NEUROMUSCULAR REEDUCATION: CPT | Performed by: PHYSICAL THERAPIST

## 2020-11-16 PROCEDURE — 97140 MANUAL THERAPY 1/> REGIONS: CPT | Performed by: PHYSICAL THERAPIST

## 2020-11-16 PROCEDURE — 97110 THERAPEUTIC EXERCISES: CPT | Performed by: PHYSICAL THERAPIST

## 2020-11-18 ENCOUNTER — OFFICE VISIT (OUTPATIENT)
Dept: PHYSICAL THERAPY | Facility: CLINIC | Age: 60
End: 2020-11-18
Payer: COMMERCIAL

## 2020-11-18 DIAGNOSIS — M79.671 FOOT PAIN, RIGHT: ICD-10-CM

## 2020-11-18 DIAGNOSIS — S93.401D SPRAIN OF LIGAMENT OF RIGHT ANKLE, SUBSEQUENT ENCOUNTER: Primary | ICD-10-CM

## 2020-11-18 PROCEDURE — 97140 MANUAL THERAPY 1/> REGIONS: CPT

## 2020-11-18 PROCEDURE — 97110 THERAPEUTIC EXERCISES: CPT

## 2020-11-23 ENCOUNTER — OFFICE VISIT (OUTPATIENT)
Dept: PHYSICAL THERAPY | Facility: CLINIC | Age: 60
End: 2020-11-23
Payer: COMMERCIAL

## 2020-11-23 DIAGNOSIS — S93.401D SPRAIN OF LIGAMENT OF RIGHT ANKLE, SUBSEQUENT ENCOUNTER: Primary | ICD-10-CM

## 2020-11-23 DIAGNOSIS — M79.671 FOOT PAIN, RIGHT: ICD-10-CM

## 2020-11-23 PROCEDURE — 97110 THERAPEUTIC EXERCISES: CPT | Performed by: PHYSICAL THERAPIST

## 2020-11-23 PROCEDURE — 97140 MANUAL THERAPY 1/> REGIONS: CPT | Performed by: PHYSICAL THERAPIST

## 2020-11-23 PROCEDURE — 97112 NEUROMUSCULAR REEDUCATION: CPT | Performed by: PHYSICAL THERAPIST

## 2020-11-25 ENCOUNTER — OFFICE VISIT (OUTPATIENT)
Dept: PHYSICAL THERAPY | Facility: CLINIC | Age: 60
End: 2020-11-25
Payer: COMMERCIAL

## 2020-11-25 DIAGNOSIS — M79.671 FOOT PAIN, RIGHT: ICD-10-CM

## 2020-11-25 DIAGNOSIS — S93.401D SPRAIN OF LIGAMENT OF RIGHT ANKLE, SUBSEQUENT ENCOUNTER: Primary | ICD-10-CM

## 2020-11-25 PROCEDURE — 97112 NEUROMUSCULAR REEDUCATION: CPT | Performed by: PHYSICAL THERAPIST

## 2020-11-25 PROCEDURE — 97140 MANUAL THERAPY 1/> REGIONS: CPT | Performed by: PHYSICAL THERAPIST

## 2020-11-25 PROCEDURE — 97110 THERAPEUTIC EXERCISES: CPT | Performed by: PHYSICAL THERAPIST

## 2021-01-28 DIAGNOSIS — Z12.39 ENCOUNTER FOR BREAST CANCER SCREENING OTHER THAN MAMMOGRAM: Primary | ICD-10-CM

## 2021-02-24 ENCOUNTER — HOSPITAL ENCOUNTER (OUTPATIENT)
Dept: MAMMOGRAPHY | Facility: CLINIC | Age: 61
Discharge: HOME | End: 2021-02-24
Payer: COMMERCIAL

## 2021-02-24 VITALS — BODY MASS INDEX: 23.49 KG/M2 | WEIGHT: 155 LBS | HEIGHT: 68 IN

## 2021-02-24 DIAGNOSIS — Z12.31 ENCOUNTER FOR SCREENING MAMMOGRAM FOR BREAST CANCER: ICD-10-CM

## 2021-02-24 DIAGNOSIS — Z12.39 ENCOUNTER FOR BREAST CANCER SCREENING OTHER THAN MAMMOGRAM: ICD-10-CM

## 2021-02-24 PROCEDURE — 77063 BREAST TOMOSYNTHESIS BI: CPT

## 2021-02-24 PROCEDURE — 77067 SCR MAMMO BI INCL CAD: CPT

## 2021-03-09 ENCOUNTER — TELEPHONE (OUTPATIENT)
Dept: HEMATOLOGY ONCOLOGY | Facility: CLINIC | Age: 61
End: 2021-03-09

## 2021-03-09 NOTE — TELEPHONE ENCOUNTER
New Patient Encounter    New Patient Intake Form   Patient Details:  Andrew Pearson  1960  [id number]    Background Information:  [address] starts by opening a telephone encounter and gathering the following information   Who is calling to schedule? If not self, relationship to patient? self   Referring Provider Dr Wilfredo Dia   What is the diagnosis? Severe Dysplasia/carcinoma in SITU   Is this diagnosis confirmed? Yes   When was the diagnosis? 3/5/21   Is there a confirmed diagnosis from a biopsy/tissue reviewed by pathology? 3/1/21   Were outside slides requested? No   Is patient aware of diagnosis? Yes   Is there a personal history and what kind? No   Is there a family history and what kind? Yes - sister breast ca   Reason for visit? New Diagnosis   Have you had any imaging or labs done? If so: when, where? Yes - [facility]   Are records in [facility]? yes   If patient has a prior history of breast cancer were old records obtained? NA   Was the patient told to bring a disk? No   Does the patient smoke or Vape? No   If yes, how many packs or cartridges per day? Scheduling Information:   Preferred [place]:  [address] [place] [address]     Are there any dates/time the patient cannot be seen? Miscellaneous:    After completing the above information, please route to Financial Counselor and the appropriate Nurse Navigator for review

## 2021-03-17 ENCOUNTER — TELEPHONE (OUTPATIENT)
Dept: GYNECOLOGIC ONCOLOGY | Facility: HOSPITAL | Age: 61
End: 2021-03-17

## 2021-03-18 ENCOUNTER — CONSULT (OUTPATIENT)
Dept: GYNECOLOGIC ONCOLOGY | Facility: HOSPITAL | Age: 61
End: 2021-03-18
Payer: COMMERCIAL

## 2021-03-18 VITALS
RESPIRATION RATE: 18 BRPM | WEIGHT: 154 LBS | HEART RATE: 87 BPM | TEMPERATURE: 99.6 F | HEIGHT: 67 IN | SYSTOLIC BLOOD PRESSURE: 134 MMHG | DIASTOLIC BLOOD PRESSURE: 78 MMHG | BODY MASS INDEX: 24.17 KG/M2

## 2021-03-18 DIAGNOSIS — D06.9 HIGH GRADE SQUAMOUS INTRAEPITHELIAL LESION (HGSIL), GRADE 3 CIN, ON BIOPSY OF CERVIX: Primary | ICD-10-CM

## 2021-03-18 PROCEDURE — 99244 OFF/OP CNSLTJ NEW/EST MOD 40: CPT | Performed by: OBSTETRICS & GYNECOLOGY

## 2021-03-18 PROCEDURE — 4004F PT TOBACCO SCREEN RCVD TLK: CPT | Performed by: OBSTETRICS & GYNECOLOGY

## 2021-03-18 PROCEDURE — 3008F BODY MASS INDEX DOCD: CPT | Performed by: OBSTETRICS & GYNECOLOGY

## 2021-03-18 RX ORDER — NICOTINE POLACRILEX 2 MG
GUM BUCCAL
COMMUNITY
End: 2022-08-04 | Stop reason: ALTCHOICE

## 2021-03-18 RX ORDER — SODIUM CHLORIDE, SODIUM LACTATE, POTASSIUM CHLORIDE, CALCIUM CHLORIDE 600; 310; 30; 20 MG/100ML; MG/100ML; MG/100ML; MG/100ML
125 INJECTION, SOLUTION INTRAVENOUS CONTINUOUS
Status: CANCELLED | OUTPATIENT
Start: 2021-03-18

## 2021-03-18 NOTE — ASSESSMENT & PLAN NOTE
72-year-old with biopsy-proven KEYONA 3  Performance status is 0     1  I discussed the pathophysiology of HPV mediated disease  2  I discussed the risks and benefits of LEEP procedure  She understands the risks and benefits of the procedure and agrees to proceed as outlined  Consent was obtained by me in the office  3   If she has dysplasia at the margin of the LEEP specimen, hysterectomy can be offered  If she has malignancy, will discuss treatment based on stage  If the dysplasia is completely removed with a negative margin, observation post LEEP would be appropriate with follow-up Pap smears  4  She understands that she will require follow-up Pap smears after treatment as per ASCCP guidelines  Thank you for the courtesy of this consultation  All questions were answered by the end of the visit

## 2021-03-18 NOTE — PATIENT INSTRUCTIONS
1  Nothing to eat or drink after midnight prior to the operation  You are allowed clear liquids until 3 hours prior to the scheduled start time of surgery  No milk products or solid food for 8 hours prior to surgery  2   Please avoid ibuprofen, aspirin, fish oil for 7 days prior to surgery  3  You may take your Protonix the morning of surgery with a sip of water
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
## 2021-04-14 ENCOUNTER — TELEPHONE (OUTPATIENT)
Dept: GYNECOLOGIC ONCOLOGY | Facility: CLINIC | Age: 61
End: 2021-04-14

## 2021-04-14 NOTE — TELEPHONE ENCOUNTER
Phone call to patient to let her know that we got her surgery cancelled per her request and that we will plan to repeat her pap for her after she tries her vitamins/supplements  Left message to call back to schedule an appointment for the pap

## 2021-04-19 DIAGNOSIS — K21.9 GASTROESOPHAGEAL REFLUX DISEASE WITHOUT ESOPHAGITIS: ICD-10-CM

## 2021-04-19 RX ORDER — PANTOPRAZOLE SODIUM 40 MG/1
40 TABLET, DELAYED RELEASE ORAL DAILY
Qty: 90 TABLET | Refills: 3 | Status: SHIPPED | OUTPATIENT
Start: 2021-04-19 | End: 2022-08-04 | Stop reason: SDUPTHER

## 2021-06-30 ENCOUNTER — OFFICE VISIT (OUTPATIENT)
Dept: GYNECOLOGIC ONCOLOGY | Facility: HOSPITAL | Age: 61
End: 2021-06-30
Payer: COMMERCIAL

## 2021-06-30 VITALS
BODY MASS INDEX: 23.7 KG/M2 | WEIGHT: 151 LBS | HEIGHT: 67 IN | DIASTOLIC BLOOD PRESSURE: 80 MMHG | HEART RATE: 85 BPM | RESPIRATION RATE: 18 BRPM | SYSTOLIC BLOOD PRESSURE: 120 MMHG

## 2021-06-30 DIAGNOSIS — D06.9 HIGH GRADE SQUAMOUS INTRAEPITHELIAL LESION (HGSIL), GRADE 3 CIN, ON BIOPSY OF CERVIX: Primary | ICD-10-CM

## 2021-06-30 PROCEDURE — 88344 IMHCHEM/IMCYTCHM EA MLT ANTB: CPT | Performed by: PATHOLOGY

## 2021-06-30 PROCEDURE — 3008F BODY MASS INDEX DOCD: CPT | Performed by: OBSTETRICS & GYNECOLOGY

## 2021-06-30 PROCEDURE — 88305 TISSUE EXAM BY PATHOLOGIST: CPT | Performed by: PATHOLOGY

## 2021-06-30 PROCEDURE — 88175 CYTOPATH C/V AUTO FLUID REDO: CPT | Performed by: PATHOLOGY

## 2021-06-30 PROCEDURE — 99212 OFFICE O/P EST SF 10 MIN: CPT | Performed by: OBSTETRICS & GYNECOLOGY

## 2021-06-30 PROCEDURE — 88141 CYTOPATH C/V INTERPRET: CPT | Performed by: PATHOLOGY

## 2021-06-30 PROCEDURE — 4004F PT TOBACCO SCREEN RCVD TLK: CPT | Performed by: OBSTETRICS & GYNECOLOGY

## 2021-06-30 NOTE — ASSESSMENT & PLAN NOTE
18-year-old with biopsy-proven KEYONA 3 (March 2021) initially scheduled for LEEP in April of 2021 who has been using natural remedies to treat her dysplasia  Repeat Pap smear and cervical biopsy was obtained today  Performance status is 0     1  Follow-up results of cervical biopsy and Pap smear and plan treatment accordingly  She is amenable to LEEP in the event that she has persistent high-grade cervical dysplasia

## 2021-06-30 NOTE — PROGRESS NOTES
Assessment/Plan:    Problem List Items Addressed This Visit        Other    High grade squamous intraepithelial lesion (HGSIL), grade 3 KEYONA, on biopsy of cervix - Primary     52-year-old with biopsy-proven KEYONA 3 (March 2021) initially scheduled for LEEP in April of 2021 who has been using natural remedies to treat her dysplasia  Repeat Pap smear and cervical biopsy was obtained today  Performance status is 0     1  Follow-up results of cervical biopsy and Pap smear and plan treatment accordingly  She is amenable to LEEP in the event that she has persistent high-grade cervical dysplasia  CHIEF COMPLAINT:  Here for follow-up Pap smear, cervical biopsy          Patient ID: Joey Oleary is a 62 y o  female  52-year-old presents for follow-up Pap smear and cervical biopsy after using 3 months of natural remedies for biopsy-proven KEYONA 3  She has no bleeding  No new complaints  No other interval change in her medications or medical history since her last visit  The following portions of the patient's history were reviewed and updated as appropriate: allergies, current medications, past family history, past medical history, past social history, past surgical history and problem list     Review of Systems   Constitutional: Negative for activity change and unexpected weight change  HENT: Negative  Eyes: Negative  Respiratory: Negative  Cardiovascular: Negative  Gastrointestinal: Negative for abdominal distention and abdominal pain  Endocrine: Negative  Genitourinary: Negative for pelvic pain and vaginal bleeding  Musculoskeletal: Negative  Skin: Negative  Allergic/Immunologic: Negative  Neurological: Negative  Hematological: Negative  Psychiatric/Behavioral: Negative          Current Outpatient Medications   Medication Sig Dispense Refill    Ascorbic Acid (VITAMIN C ADULT GUMMIES PO) Take by mouth 3 daily      b complex vitamins tablet Take 1 tablet by mouth daily      Biotin 1 MG CAPS Take by mouth      Cholecalciferol (VITAMIN D3) 2000 units capsule Take 1 capsule (2,000 Units total) by mouth daily (Patient taking differently: 2 daily) 30 capsule 5    loratadine (CLARITIN) 10 mg tablet Take 10 mg by mouth daily      Multiple Vitamin (MULTIVITAMIN) tablet Take 2 tablets by mouth daily       pantoprazole (PROTONIX) 40 mg tablet Take 1 tablet (40 mg total) by mouth daily 90 tablet 3    ZINC OXIDE PO Take by mouth 3 daily  (Power zinc)       No current facility-administered medications for this visit  Objective:    Blood pressure 120/80, pulse 85, resp  rate 18, height 5' 7" (1 702 m), weight 68 5 kg (151 lb)  Body mass index is 23 65 kg/m²  Body surface area is 1 79 meters squared  Physical Exam  Vitals reviewed  Exam conducted with a chaperone present  Constitutional:       General: She is not in acute distress  Appearance: Normal appearance  She is not ill-appearing  HENT:      Head: Normocephalic and atraumatic  Eyes:      General: No scleral icterus  Right eye: No discharge  Left eye: No discharge  Conjunctiva/sclera: Conjunctivae normal    Pulmonary:      Effort: Pulmonary effort is normal    Genitourinary:     Comments: Speculum examination reveals a grossly normal cervix with the small amount of erythema present at the os  There is no visible lesion  Pap smear was obtained  Cervical biopsy was obtained from the posterior cervix at approximately 12:00 p m  just adjacent to the cervical os  Musculoskeletal:      Right lower leg: No edema  Left lower leg: No edema  Skin:     General: Skin is warm and dry  Coloration: Skin is not jaundiced  Findings: No rash  Neurological:      General: No focal deficit present  Mental Status: She is alert and oriented to person, place, and time  Cranial Nerves: No cranial nerve deficit  Motor: No weakness        Gait: Gait normal    Psychiatric: Mood and Affect: Mood normal          Behavior: Behavior normal          Thought Content: Thought content normal          Judgment: Judgment normal        Lesion Biopsy    Date/Time: 6/30/2021 11:22 AM  Performed by: Erick Plascencia MD  Authorized by: Erick Plascencia MD   Universal Protocol:  Consent: Verbal consent obtained  Consent given by: patient      Procedure Details - Skin Biopsy:     Biopsy tissue type: mucous membrane    Biopsy method: punch biopsy      Body area: Anogenital    Anogenital location: Cervix  Vaginal Lesion: No      Final defect size (mm):  2    Malignancy: malignancy unknown       Tischler biopsy forcep was used to take a biopsy of the posterior aspect of the cervix adjacent to the cervical os  Hemostasis was spontaneous  She tolerated the biopsy well

## 2021-07-09 ENCOUNTER — PREP FOR PROCEDURE (OUTPATIENT)
Dept: GYNECOLOGIC ONCOLOGY | Facility: CLINIC | Age: 61
End: 2021-07-09

## 2021-07-09 DIAGNOSIS — D06.9 HIGH GRADE SQUAMOUS INTRAEPITHELIAL LESION (HGSIL), GRADE 3 CIN, ON BIOPSY OF CERVIX: Primary | ICD-10-CM

## 2021-07-09 LAB
LAB AP GYN PRIMARY INTERPRETATION: ABNORMAL
Lab: ABNORMAL
PATH INTERP SPEC-IMP: ABNORMAL

## 2021-07-09 RX ORDER — ACETAMINOPHEN 325 MG/1
975 TABLET ORAL ONCE
Status: CANCELLED | OUTPATIENT
Start: 2021-07-09 | End: 2021-07-09

## 2021-07-09 RX ORDER — SODIUM CHLORIDE, SODIUM LACTATE, POTASSIUM CHLORIDE, CALCIUM CHLORIDE 600; 310; 30; 20 MG/100ML; MG/100ML; MG/100ML; MG/100ML
125 INJECTION, SOLUTION INTRAVENOUS CONTINUOUS
Status: CANCELLED | OUTPATIENT
Start: 2021-08-18

## 2021-08-03 ENCOUNTER — TELEPHONE (OUTPATIENT)
Dept: HEMATOLOGY ONCOLOGY | Facility: CLINIC | Age: 61
End: 2021-08-03

## 2021-08-03 NOTE — TELEPHONE ENCOUNTER
Appointment Confirmation     Appointment with  Dr Salomon Gaytan   Appointment date & time 08/18 at 7:30am   Location OR   Patient verbilized Understanding  yes

## 2021-08-13 NOTE — PRE-PROCEDURE INSTRUCTIONS
Pre-Surgery Instructions:   Medication Instructions    Ascorbic Acid (VITAMIN C ADULT GUMMIES PO) Instructed patient per Anesthesia Guidelines   b complex vitamins tablet Instructed patient per Anesthesia Guidelines   Biotin 1 MG CAPS Instructed patient per Anesthesia Guidelines   Cholecalciferol (VITAMIN D3) 2000 units capsule Instructed patient per Anesthesia Guidelines   loratadine (CLARITIN) 10 mg tablet Instructed patient per Anesthesia Guidelines   Multiple Vitamin (MULTIVITAMIN) tablet Instructed patient per Anesthesia Guidelines   pantoprazole (PROTONIX) 40 mg tablet Instructed patient per Anesthesia Guidelines  take am of sx    ZINC OXIDE PO Instructed patient per Anesthesia Guidelines  You will receive a phone call from hospital for arrival time  Please call surgeons office if any changes in your condition  Wear easy on/off clothing; consider type of surgery;  Valuables, jewelry, piercing's please keep at home  **COVID-19  education/surgical guidelines      Please: No contact lenses or eye make up, artificial eyelashes    Please secure transportation     Follow pre surgery showering or cleaning instructions as  Reviewed by nurse or surgeons office      Questions answered and concerns addressed

## 2021-08-17 ENCOUNTER — ANESTHESIA EVENT (OUTPATIENT)
Dept: PERIOP | Facility: HOSPITAL | Age: 61
End: 2021-08-17
Payer: COMMERCIAL

## 2021-08-17 NOTE — ANESTHESIA PREPROCEDURE EVALUATION
Procedure:  BIOPSY LEEP CERVIX (N/A Cervix)    Relevant Problems   Other   (+) Graves disease   (+) High grade squamous intraepithelial lesion (HGSIL), grade 3 KEYONA, on biopsy of cervix   GERD -WELL CONTROLLED     Physical Exam    Airway    Mallampati score: II  TM Distance: >3 FB  Neck ROM: full     Dental   No notable dental hx     Cardiovascular      Pulmonary      Other Findings        Anesthesia Plan  ASA Score- 2     Anesthesia Type- general with ASA Monitors  Additional Monitors:   Airway Plan: LMA  Plan Factors-Exercise tolerance (METS): >4 METS  Chart reviewed  Patient is a current smoker (10 ppd  )  Patient not instructed to abstain from smoking on day of procedure  Patient did not smoke on day of surgery  Induction- intravenous  Postoperative Plan-     Informed Consent- Anesthetic plan and risks discussed with patient  I personally reviewed this patient with the CRNA  Discussed and agreed on the Anesthesia Plan with the CRNA  Jacquelyn Mccartney

## 2021-08-18 ENCOUNTER — ANESTHESIA (OUTPATIENT)
Dept: PERIOP | Facility: HOSPITAL | Age: 61
End: 2021-08-18
Payer: COMMERCIAL

## 2021-08-18 ENCOUNTER — HOSPITAL ENCOUNTER (OUTPATIENT)
Facility: HOSPITAL | Age: 61
Setting detail: OUTPATIENT SURGERY
Discharge: HOME | End: 2021-08-18
Attending: OBSTETRICS & GYNECOLOGY | Admitting: OBSTETRICS & GYNECOLOGY
Payer: COMMERCIAL

## 2021-08-18 VITALS
OXYGEN SATURATION: 99 % | SYSTOLIC BLOOD PRESSURE: 124 MMHG | DIASTOLIC BLOOD PRESSURE: 66 MMHG | BODY MASS INDEX: 23.51 KG/M2 | RESPIRATION RATE: 16 BRPM | TEMPERATURE: 98 F | HEART RATE: 56 BPM | WEIGHT: 149.8 LBS | HEIGHT: 67 IN

## 2021-08-18 DIAGNOSIS — D06.9 HIGH GRADE SQUAMOUS INTRAEPITHELIAL LESION (HGSIL), GRADE 3 CIN, ON BIOPSY OF CERVIX: ICD-10-CM

## 2021-08-18 LAB
ANION GAP SERPL CALCULATED.3IONS-SCNC: 7 MMOL/L (ref 4–13)
BASOPHILS # BLD AUTO: 0.03 THOUSANDS/ΜL (ref 0–0.1)
BASOPHILS NFR BLD AUTO: 1 % (ref 0–1)
BUN SERPL-MCNC: 15 MG/DL (ref 5–25)
CALCIUM SERPL-MCNC: 8.8 MG/DL (ref 8.3–10.1)
CHLORIDE SERPL-SCNC: 107 MMOL/L (ref 100–108)
CO2 SERPL-SCNC: 29 MMOL/L (ref 21–32)
CREAT SERPL-MCNC: 0.72 MG/DL (ref 0.6–1.3)
EOSINOPHIL # BLD AUTO: 0.13 THOUSAND/ΜL (ref 0–0.61)
EOSINOPHIL NFR BLD AUTO: 2 % (ref 0–6)
ERYTHROCYTE [DISTWIDTH] IN BLOOD BY AUTOMATED COUNT: 12.5 % (ref 11.6–15.1)
GFR SERPL CREATININE-BSD FRML MDRD: 91 ML/MIN/1.73SQ M
GLUCOSE P FAST SERPL-MCNC: 103 MG/DL (ref 65–99)
GLUCOSE SERPL-MCNC: 103 MG/DL (ref 65–140)
HCT VFR BLD AUTO: 44.4 % (ref 34.8–46.1)
HGB BLD-MCNC: 14.6 G/DL (ref 11.5–15.4)
IMM GRANULOCYTES # BLD AUTO: 0.01 THOUSAND/UL (ref 0–0.2)
IMM GRANULOCYTES NFR BLD AUTO: 0 % (ref 0–2)
LYMPHOCYTES # BLD AUTO: 1.68 THOUSANDS/ΜL (ref 0.6–4.47)
LYMPHOCYTES NFR BLD AUTO: 28 % (ref 14–44)
MCH RBC QN AUTO: 30.7 PG (ref 26.8–34.3)
MCHC RBC AUTO-ENTMCNC: 32.9 G/DL (ref 31.4–37.4)
MCV RBC AUTO: 94 FL (ref 82–98)
MONOCYTES # BLD AUTO: 0.76 THOUSAND/ΜL (ref 0.17–1.22)
MONOCYTES NFR BLD AUTO: 13 % (ref 4–12)
NEUTROPHILS # BLD AUTO: 3.42 THOUSANDS/ΜL (ref 1.85–7.62)
NEUTS SEG NFR BLD AUTO: 56 % (ref 43–75)
NRBC BLD AUTO-RTO: 0 /100 WBCS
PLATELET # BLD AUTO: 214 THOUSANDS/UL (ref 149–390)
PMV BLD AUTO: 9.4 FL (ref 8.9–12.7)
POTASSIUM SERPL-SCNC: 4.3 MMOL/L (ref 3.5–5.3)
RBC # BLD AUTO: 4.75 MILLION/UL (ref 3.81–5.12)
SODIUM SERPL-SCNC: 143 MMOL/L (ref 136–145)
WBC # BLD AUTO: 6.03 THOUSAND/UL (ref 4.31–10.16)

## 2021-08-18 PROCEDURE — 88305 TISSUE EXAM BY PATHOLOGIST: CPT | Performed by: PATHOLOGY

## 2021-08-18 PROCEDURE — 99024 POSTOP FOLLOW-UP VISIT: CPT | Performed by: OBSTETRICS & GYNECOLOGY

## 2021-08-18 PROCEDURE — 85025 COMPLETE CBC W/AUTO DIFF WBC: CPT | Performed by: OBSTETRICS & GYNECOLOGY

## 2021-08-18 PROCEDURE — 57522 CONIZATION OF CERVIX: CPT | Performed by: OBSTETRICS & GYNECOLOGY

## 2021-08-18 PROCEDURE — 80048 BASIC METABOLIC PNL TOTAL CA: CPT | Performed by: OBSTETRICS & GYNECOLOGY

## 2021-08-18 PROCEDURE — 88307 TISSUE EXAM BY PATHOLOGIST: CPT | Performed by: PATHOLOGY

## 2021-08-18 RX ORDER — KETOROLAC TROMETHAMINE 30 MG/ML
INJECTION, SOLUTION INTRAMUSCULAR; INTRAVENOUS AS NEEDED
Status: DISCONTINUED | OUTPATIENT
Start: 2021-08-18 | End: 2021-08-18

## 2021-08-18 RX ORDER — MIDAZOLAM HYDROCHLORIDE 2 MG/2ML
INJECTION, SOLUTION INTRAMUSCULAR; INTRAVENOUS AS NEEDED
Status: DISCONTINUED | OUTPATIENT
Start: 2021-08-18 | End: 2021-08-18

## 2021-08-18 RX ORDER — ONDANSETRON 2 MG/ML
INJECTION INTRAMUSCULAR; INTRAVENOUS AS NEEDED
Status: DISCONTINUED | OUTPATIENT
Start: 2021-08-18 | End: 2021-08-18

## 2021-08-18 RX ORDER — ACETAMINOPHEN 325 MG/1
975 TABLET ORAL ONCE
Status: COMPLETED | OUTPATIENT
Start: 2021-08-18 | End: 2021-08-18

## 2021-08-18 RX ORDER — FENTANYL CITRATE/PF 50 MCG/ML
25 SYRINGE (ML) INJECTION
Status: DISCONTINUED | OUTPATIENT
Start: 2021-08-18 | End: 2021-08-18 | Stop reason: HOSPADM

## 2021-08-18 RX ORDER — ONDANSETRON 2 MG/ML
4 INJECTION INTRAMUSCULAR; INTRAVENOUS ONCE AS NEEDED
Status: DISCONTINUED | OUTPATIENT
Start: 2021-08-18 | End: 2021-08-18 | Stop reason: HOSPADM

## 2021-08-18 RX ORDER — FENTANYL CITRATE 50 UG/ML
INJECTION, SOLUTION INTRAMUSCULAR; INTRAVENOUS AS NEEDED
Status: DISCONTINUED | OUTPATIENT
Start: 2021-08-18 | End: 2021-08-18

## 2021-08-18 RX ORDER — LIDOCAINE HYDROCHLORIDE 10 MG/ML
INJECTION, SOLUTION EPIDURAL; INFILTRATION; INTRACAUDAL; PERINEURAL AS NEEDED
Status: DISCONTINUED | OUTPATIENT
Start: 2021-08-18 | End: 2021-08-18

## 2021-08-18 RX ORDER — ACETIC ACID 5 %
LIQUID (ML) MISCELLANEOUS AS NEEDED
Status: DISCONTINUED | OUTPATIENT
Start: 2021-08-18 | End: 2021-08-18 | Stop reason: HOSPADM

## 2021-08-18 RX ORDER — DEXAMETHASONE SODIUM PHOSPHATE 10 MG/ML
INJECTION, SOLUTION INTRAMUSCULAR; INTRAVENOUS AS NEEDED
Status: DISCONTINUED | OUTPATIENT
Start: 2021-08-18 | End: 2021-08-18

## 2021-08-18 RX ORDER — SODIUM CHLORIDE, SODIUM LACTATE, POTASSIUM CHLORIDE, CALCIUM CHLORIDE 600; 310; 30; 20 MG/100ML; MG/100ML; MG/100ML; MG/100ML
125 INJECTION, SOLUTION INTRAVENOUS CONTINUOUS
Status: DISCONTINUED | OUTPATIENT
Start: 2021-08-18 | End: 2021-08-18 | Stop reason: HOSPADM

## 2021-08-18 RX ORDER — PROPOFOL 10 MG/ML
INJECTION, EMULSION INTRAVENOUS AS NEEDED
Status: DISCONTINUED | OUTPATIENT
Start: 2021-08-18 | End: 2021-08-18

## 2021-08-18 RX ORDER — HYDROMORPHONE HCL IN WATER/PF 6 MG/30 ML
0.2 PATIENT CONTROLLED ANALGESIA SYRINGE INTRAVENOUS
Status: DISCONTINUED | OUTPATIENT
Start: 2021-08-18 | End: 2021-08-18 | Stop reason: HOSPADM

## 2021-08-18 RX ORDER — LIDOCAINE HYDROCHLORIDE 10 MG/ML
0.5 INJECTION, SOLUTION EPIDURAL; INFILTRATION; INTRACAUDAL; PERINEURAL ONCE AS NEEDED
Status: DISCONTINUED | OUTPATIENT
Start: 2021-08-18 | End: 2021-08-18 | Stop reason: HOSPADM

## 2021-08-18 RX ADMIN — LIDOCAINE HYDROCHLORIDE 50 MG: 10 INJECTION, SOLUTION EPIDURAL; INFILTRATION; INTRACAUDAL; PERINEURAL at 07:44

## 2021-08-18 RX ADMIN — DEXAMETHASONE SODIUM PHOSPHATE 10 MG: 10 INJECTION, SOLUTION INTRAMUSCULAR; INTRAVENOUS at 07:49

## 2021-08-18 RX ADMIN — SODIUM CHLORIDE, SODIUM LACTATE, POTASSIUM CHLORIDE, AND CALCIUM CHLORIDE 125 ML/HR: .6; .31; .03; .02 INJECTION, SOLUTION INTRAVENOUS at 06:45

## 2021-08-18 RX ADMIN — MIDAZOLAM 2 MG: 1 INJECTION INTRAMUSCULAR; INTRAVENOUS at 07:32

## 2021-08-18 RX ADMIN — PROPOFOL 170 MG: 10 INJECTION, EMULSION INTRAVENOUS at 07:44

## 2021-08-18 RX ADMIN — ONDANSETRON 4 MG: 2 INJECTION INTRAMUSCULAR; INTRAVENOUS at 07:52

## 2021-08-18 RX ADMIN — KETOROLAC TROMETHAMINE 30 MG: 30 INJECTION, SOLUTION INTRAMUSCULAR at 08:14

## 2021-08-18 RX ADMIN — FENTANYL CITRATE 25 MCG: 50 INJECTION, SOLUTION INTRAMUSCULAR; INTRAVENOUS at 08:08

## 2021-08-18 RX ADMIN — ACETAMINOPHEN 975 MG: 325 TABLET, FILM COATED ORAL at 06:32

## 2021-08-18 RX ADMIN — FENTANYL CITRATE 50 MCG: 50 INJECTION, SOLUTION INTRAMUSCULAR; INTRAVENOUS at 07:54

## 2021-08-18 RX ADMIN — FENTANYL CITRATE 25 MCG: 50 INJECTION, SOLUTION INTRAMUSCULAR; INTRAVENOUS at 07:44
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
## 2021-08-18 NOTE — DISCHARGE INSTRUCTIONS
Cervical Cone Biopsy   WHAT YOU NEED TO KNOW:   A cervical cone biopsy is surgery to take cells from your cervix  Surgery may be done so cells can be tested for cancer or can be removed before they become cancer  DISCHARGE INSTRUCTIONS:   Call your surgeon or gynecologist if:   · You have white or yellow vaginal discharge  · You have more vaginal bleeding than you were told to expect  · You have a fever  · You have new or increased pain in your lower abdomen and pelvic area  · You have vaginal bleeding, and it is not time for your monthly period  · You feel pain when you urinate, or your urine looks cloudy  · You have questions or concerns about your condition or care  Nothing inserted in to the vagina as instructed by Dr Lisa Dooley  · Take your medicine as directed  Contact your healthcare provider if you think your medicine is not helping or if you have side effects  Tell him or her if you are allergic to any medicine  Keep a list of the medicines, vitamins, and herbs you take  Include the amounts, and when and why you take them  Bring the list or the pill bottles to follow-up visits  Carry your medicine list with you in case of an emergency  Self-care:   · Care for the surgery area as directed  Do not use tampons or have sex for 2 to 3 weeks after your surgery  Do not douche during this time  · Ask about vaccines  Ask your healthcare provider or gynecologist for more information about the vaccine for human papillomavirus (HPV)  The HPV vaccine may help decrease your risk for an HPV infection and abnormal cervical cells  A condom can help prevent the spread of HPV during sex  Ask your healthcare provider for more information on ways to prevent HPV infection  · Talk to your providers about pregnancy  You may have trouble becoming pregnant after a cervical cone biopsy  If you do become pregnant, your baby may need to be delivered early   You may need more care during a future pregnancy to help prevent problems  · Do not smoke  Smoking increases the risk of abnormal cells in your cervix  Ask for information if you need help quitting  Do not use E-cigarettes or smokeless tobacco in place of cigarettes or to help you quit  These still contain nicotine  Follow up with your surgeon or gynecologist as directed: You may need to return to make sure your cervix is healing  Write down your questions so you remember to ask them during your visits  © Copyright [employer] 2021 Information is for End User's use only and may not be sold, redistributed or otherwise used for commercial purposes  All illustrations and images included in CareNotes® are the copyrighted property of [employer]  or [facility] [employer]   The above information is an  only  It is not intended as medical advice for individual conditions or treatments  Talk to your doctor, nurse or pharmacist before following any medical regimen to see if it is safe and effective for you

## 2021-08-18 NOTE — ANESTHESIA POSTPROCEDURE EVALUATION
Post-Op Assessment Note    CV Status:  Stable  Pain Score: 0    Pain management: adequate     Mental Status:  Alert and awake   Hydration Status:  Euvolemic   PONV Controlled:  Controlled   Airway Patency:  Patent      Post Op Vitals Reviewed: Yes      Staff: CRNA         No complications documented      BP   128/64   Temp  97 6   Pulse  62   62Resp   16   SpO2   99%

## 2021-08-18 NOTE — OP NOTE
OPERATIVE REPORT  PATIENT NAME: Stefano Schulz    :  1960  MRN: [id number]  Pt Location: [facility] OR ROOM 01    SURGERY DATE: 2021    Surgeon(s) and Role:     * Josselyn Peres MD - Primary    Preop Diagnosis:  High grade squamous intraepithelial lesion (HGSIL), grade 3 EKYONA, on biopsy of cervix [D06 9]    Post-Op Diagnosis Codes:     * High grade squamous intraepithelial lesion (HGSIL), grade 3 KEYONA, on biopsy of cervix [D06 9]    Procedure(s) (LRB):  BIOPSY LEEP CERVIX, ENDOCERVICAL CURETTAGE (N/A)    Specimen(s):  ID Type Source Tests Collected by Time Destination   1 : LEEP Tissue Cervix, Endocervical TISSUE EXAM Josselyn Peres MD 2021 0800    2 : curettings Tissue Endocervical TISSUE EXAM Josselyn Peres MD 2021 0801        Estimated Blood Loss:   Minimal    Drains:  * No LDAs found *    Anesthesia Type:   General/LMA    Operative Indications:  High grade squamous intraepithelial lesion (HGSIL), grade 3 KEYONA, on biopsy of cervix [D59]  23-year-old with biopsy-proven KEYONA 3 presents for LEEP    Operative Findings:  1  Exam under anesthesia revealed a grossly normal cervix  The uterus was approximately 6 weeks in size  No palpable adnexal masses  2  5% ascetic acid was applied cervix and there was aceto-white epithelium with mosaicism evident on the posterior lip of the cervix  Complications:   None    Procedure and Technique:  After informed consent was obtained, the patient was taken to the operating room where general anesthesia was administered via LMA  She was then prepped and draped in normal sterile fashion in the dorsal lithotomy position  Examination under anesthesia was then performed with findings noted as above  5% ascetic acid was applied to the cervix and upper vagina  Findings noted as above  A coated speculum was placed in patient's vagina  The anterior lip of cervix was grasped with single-tooth tenaculum    A 15 x 12mm loop was used to excise the aceto-white epithelium  The LEEP was performed in 2 sections with sent as 1 specimen  ECC was then obtained  Tenaculum was removed  Hemostasis was achieved using cautery and Monsel's solution  The patient was then awakened and transferred to the recovery room in stable condition  All instrument counts correct x2 for the procedure  No complications    Estimated blood loss is minimal    I was present for the entire procedure    Patient Disposition:  PACU     SIGNATURE: Constantin Stewart MD  DATE: August 18, 2021  TIME: 8:10 AM

## 2021-09-29 ENCOUNTER — OFFICE VISIT (OUTPATIENT)
Dept: GYNECOLOGIC ONCOLOGY | Facility: HOSPITAL | Age: 61
End: 2021-09-29
Payer: OTHER GOVERNMENT

## 2021-09-29 VITALS
BODY MASS INDEX: 23.7 KG/M2 | DIASTOLIC BLOOD PRESSURE: 78 MMHG | HEIGHT: 67 IN | WEIGHT: 151 LBS | TEMPERATURE: 98.4 F | HEART RATE: 80 BPM | OXYGEN SATURATION: 98 % | RESPIRATION RATE: 17 BRPM | SYSTOLIC BLOOD PRESSURE: 124 MMHG

## 2021-09-29 DIAGNOSIS — D06.9 HIGH GRADE SQUAMOUS INTRAEPITHELIAL LESION (HGSIL), GRADE 3 CIN, ON BIOPSY OF CERVIX: Primary | ICD-10-CM

## 2021-09-29 PROCEDURE — 99215 OFFICE O/P EST HI 40 MIN: CPT | Performed by: OBSTETRICS & GYNECOLOGY

## 2021-09-29 RX ORDER — HEPARIN SODIUM 5000 [USP'U]/ML
5000 INJECTION, SOLUTION INTRAVENOUS; SUBCUTANEOUS
Status: CANCELLED | OUTPATIENT
Start: 2021-09-29 | End: 2021-09-30

## 2021-09-29 RX ORDER — CEFAZOLIN SODIUM 1 G/50ML
1000 SOLUTION INTRAVENOUS ONCE
Status: CANCELLED | OUTPATIENT
Start: 2021-09-29 | End: 2021-09-29

## 2021-09-29 RX ORDER — SODIUM CHLORIDE, SODIUM LACTATE, POTASSIUM CHLORIDE, CALCIUM CHLORIDE 600; 310; 30; 20 MG/100ML; MG/100ML; MG/100ML; MG/100ML
125 INJECTION, SOLUTION INTRAVENOUS CONTINUOUS
Status: CANCELLED | OUTPATIENT
Start: 2021-09-29

## 2021-09-29 RX ORDER — GABAPENTIN 100 MG/1
100 CAPSULE ORAL ONCE
Status: CANCELLED | OUTPATIENT
Start: 2021-09-29 | End: 2021-09-29

## 2021-09-29 RX ORDER — ACETAMINOPHEN 325 MG/1
975 TABLET ORAL ONCE
Status: CANCELLED | OUTPATIENT
Start: 2021-09-29 | End: 2021-09-29

## 2021-12-01 ENCOUNTER — ANESTHESIA EVENT (OUTPATIENT)
Dept: PERIOP | Facility: HOSPITAL | Age: 61
End: 2021-12-01
Payer: OTHER GOVERNMENT

## 2021-12-03 ENCOUNTER — OFFICE VISIT (OUTPATIENT)
Dept: LAB | Facility: HOSPITAL | Age: 61
End: 2021-12-03
Attending: OBSTETRICS & GYNECOLOGY
Payer: OTHER GOVERNMENT

## 2021-12-03 ENCOUNTER — TELEPHONE (OUTPATIENT)
Dept: HEMATOLOGY ONCOLOGY | Facility: CLINIC | Age: 61
End: 2021-12-03

## 2021-12-03 DIAGNOSIS — D06.9 HIGH GRADE SQUAMOUS INTRAEPITHELIAL LESION (HGSIL), GRADE 3 CIN, ON BIOPSY OF CERVIX: ICD-10-CM

## 2021-12-03 PROCEDURE — 93005 ELECTROCARDIOGRAM TRACING: CPT

## 2021-12-03 NOTE — PRE-PROCEDURE INSTRUCTIONS
Pre-Surgery Instructions:   Medication Instructions    Ascorbic Acid (VITAMIN C ADULT GUMMIES PO) As of today,stop taking prior to surgery    b complex vitamins tablet As of today,stop taking prior to surgery    Biotin 1 MG CAPS As of today,stop taking prior to surgery    Cholecalciferol (VITAMIN D3) 2000 units capsule As of today,stop taking prior to surgery    Multiple Vitamin (MULTIVITAMIN) tablet As of today,stop taking prior to surgery    pantoprazole (PROTONIX) 40 mg tablet Continue taking as ordered, ok to take [facility] As of today,stop taking prior to surgery    Covid screening negative as per patient  Reviewed with patient via phone all medication instructions  Advised not to take any NSAID's, Vitamins or Herbal products prior to the DOS  Acetaminophen products are ok to take  Reviewed showering instructions as given by surgical office  Smoking Cessation Education Referral offered but patient is not interested at this time  Instructed about NPO after midnight the night before DOS, except sips of water with allowed medications in AM on DOS  Informed about call from [facility] with the time to arrive for the scheduled surgery  Instructed to call Surgical office with any questions or concerns  Patient verbalized understanding

## 2021-12-06 LAB
ATRIAL RATE: 75 BPM
P AXIS: 80 DEGREES
PR INTERVAL: 160 MS
QRS AXIS: 27 DEGREES
QRSD INTERVAL: 74 MS
QT INTERVAL: 394 MS
QTC INTERVAL: 439 MS
T WAVE AXIS: 68 DEGREES
VENTRICULAR RATE: 75 BPM

## 2021-12-06 PROCEDURE — 93010 ELECTROCARDIOGRAM REPORT: CPT | Performed by: INTERNAL MEDICINE

## 2021-12-07 ENCOUNTER — ANESTHESIA (OUTPATIENT)
Dept: PERIOP | Facility: HOSPITAL | Age: 61
End: 2021-12-07
Payer: OTHER GOVERNMENT

## 2022-01-03 PROBLEM — K21.9 GASTROESOPHAGEAL REFLUX DISEASE: Status: ACTIVE | Noted: 2022-01-03

## 2022-01-04 ENCOUNTER — HOSPITAL ENCOUNTER (OUTPATIENT)
Facility: HOSPITAL | Age: 62
Setting detail: OUTPATIENT SURGERY
Discharge: HOME | End: 2022-01-04
Attending: OBSTETRICS & GYNECOLOGY | Admitting: OBSTETRICS & GYNECOLOGY
Payer: OTHER GOVERNMENT

## 2022-01-04 VITALS
HEIGHT: 67 IN | DIASTOLIC BLOOD PRESSURE: 58 MMHG | HEART RATE: 53 BPM | SYSTOLIC BLOOD PRESSURE: 101 MMHG | TEMPERATURE: 97.1 F | OXYGEN SATURATION: 98 % | BODY MASS INDEX: 23.23 KG/M2 | RESPIRATION RATE: 20 BRPM | WEIGHT: 148 LBS

## 2022-01-04 DIAGNOSIS — D06.9 HIGH GRADE SQUAMOUS INTRAEPITHELIAL LESION (HGSIL), GRADE 3 CIN, ON BIOPSY OF CERVIX: ICD-10-CM

## 2022-01-04 PROBLEM — Z98.890 S/P ROBOT-ASSISTED SURGICAL PROCEDURE: Status: ACTIVE | Noted: 2022-01-04

## 2022-01-04 LAB
ABO GROUP BLD: NORMAL
ABO GROUP BLD: NORMAL
BLD GP AB SCN SERPL QL: NEGATIVE
GLUCOSE SERPL-MCNC: 148 MG/DL (ref 65–140)
RH BLD: POSITIVE
RH BLD: POSITIVE
SPECIMEN EXPIRATION DATE: NORMAL

## 2022-01-04 PROCEDURE — 88305 TISSUE EXAM BY PATHOLOGIST: CPT | Performed by: PATHOLOGY

## 2022-01-04 PROCEDURE — 86900 BLOOD TYPING SEROLOGIC ABO: CPT | Performed by: STUDENT IN AN ORGANIZED HEALTH CARE EDUCATION/TRAINING PROGRAM

## 2022-01-04 PROCEDURE — 58571 TLH W/T/O 250 G OR LESS: CPT | Performed by: OBSTETRICS & GYNECOLOGY

## 2022-01-04 PROCEDURE — 88309 TISSUE EXAM BY PATHOLOGIST: CPT | Performed by: PATHOLOGY

## 2022-01-04 PROCEDURE — 88307 TISSUE EXAM BY PATHOLOGIST: CPT | Performed by: PATHOLOGY

## 2022-01-04 PROCEDURE — 82948 REAGENT STRIP/BLOOD GLUCOSE: CPT

## 2022-01-04 PROCEDURE — 86850 RBC ANTIBODY SCREEN: CPT | Performed by: STUDENT IN AN ORGANIZED HEALTH CARE EDUCATION/TRAINING PROGRAM

## 2022-01-04 PROCEDURE — 86901 BLOOD TYPING SEROLOGIC RH(D): CPT | Performed by: STUDENT IN AN ORGANIZED HEALTH CARE EDUCATION/TRAINING PROGRAM

## 2022-01-04 PROCEDURE — NC001 PR NO CHARGE: Performed by: OBSTETRICS & GYNECOLOGY

## 2022-01-04 PROCEDURE — S2900 ROBOTIC SURGICAL SYSTEM: HCPCS | Performed by: OBSTETRICS & GYNECOLOGY

## 2022-01-04 RX ORDER — CEFAZOLIN SODIUM 2 G/50ML
SOLUTION INTRAVENOUS AS NEEDED
Status: DISCONTINUED | OUTPATIENT
Start: 2022-01-04 | End: 2022-01-04

## 2022-01-04 RX ORDER — MAGNESIUM HYDROXIDE 1200 MG/15ML
LIQUID ORAL AS NEEDED
Status: DISCONTINUED | OUTPATIENT
Start: 2022-01-04 | End: 2022-01-04 | Stop reason: HOSPADM

## 2022-01-04 RX ORDER — SODIUM CHLORIDE, SODIUM LACTATE, POTASSIUM CHLORIDE, CALCIUM CHLORIDE 600; 310; 30; 20 MG/100ML; MG/100ML; MG/100ML; MG/100ML
125 INJECTION, SOLUTION INTRAVENOUS CONTINUOUS
Status: DISCONTINUED | OUTPATIENT
Start: 2022-01-04 | End: 2022-01-04 | Stop reason: HOSPADM

## 2022-01-04 RX ORDER — ONDANSETRON 2 MG/ML
INJECTION INTRAMUSCULAR; INTRAVENOUS AS NEEDED
Status: DISCONTINUED | OUTPATIENT
Start: 2022-01-04 | End: 2022-01-04

## 2022-01-04 RX ORDER — EPHEDRINE SULFATE 50 MG/ML
INJECTION INTRAVENOUS AS NEEDED
Status: DISCONTINUED | OUTPATIENT
Start: 2022-01-04 | End: 2022-01-04

## 2022-01-04 RX ORDER — BUPIVACAINE HYDROCHLORIDE 2.5 MG/ML
INJECTION, SOLUTION EPIDURAL; INFILTRATION; INTRACAUDAL AS NEEDED
Status: DISCONTINUED | OUTPATIENT
Start: 2022-01-04 | End: 2022-01-04 | Stop reason: HOSPADM

## 2022-01-04 RX ORDER — HYDROMORPHONE HCL IN WATER/PF 6 MG/30 ML
0.2 PATIENT CONTROLLED ANALGESIA SYRINGE INTRAVENOUS
Status: DISCONTINUED | OUTPATIENT
Start: 2022-01-04 | End: 2022-01-04 | Stop reason: HOSPADM

## 2022-01-04 RX ORDER — PROPOFOL 10 MG/ML
INJECTION, EMULSION INTRAVENOUS CONTINUOUS PRN
Status: DISCONTINUED | OUTPATIENT
Start: 2022-01-04 | End: 2022-01-04

## 2022-01-04 RX ORDER — DEXAMETHASONE SODIUM PHOSPHATE 10 MG/ML
INJECTION, SOLUTION INTRAMUSCULAR; INTRAVENOUS AS NEEDED
Status: DISCONTINUED | OUTPATIENT
Start: 2022-01-04 | End: 2022-01-04

## 2022-01-04 RX ORDER — FENTANYL CITRATE 50 UG/ML
INJECTION, SOLUTION INTRAMUSCULAR; INTRAVENOUS AS NEEDED
Status: DISCONTINUED | OUTPATIENT
Start: 2022-01-04 | End: 2022-01-04

## 2022-01-04 RX ORDER — SODIUM CHLORIDE 9 MG/ML
INJECTION, SOLUTION INTRAVENOUS CONTINUOUS PRN
Status: DISCONTINUED | OUTPATIENT
Start: 2022-01-04 | End: 2022-01-04

## 2022-01-04 RX ORDER — HEPARIN SODIUM 5000 [USP'U]/ML
5000 INJECTION, SOLUTION INTRAVENOUS; SUBCUTANEOUS
Status: COMPLETED | OUTPATIENT
Start: 2022-01-04 | End: 2022-01-04

## 2022-01-04 RX ORDER — ACETAMINOPHEN 325 MG/1
650 TABLET ORAL EVERY 6 HOURS PRN
Status: DISCONTINUED | OUTPATIENT
Start: 2022-01-04 | End: 2022-01-04 | Stop reason: HOSPADM

## 2022-01-04 RX ORDER — OXYCODONE HYDROCHLORIDE 5 MG/1
5 TABLET ORAL EVERY 4 HOURS PRN
Status: DISCONTINUED | OUTPATIENT
Start: 2022-01-04 | End: 2022-01-04 | Stop reason: HOSPADM

## 2022-01-04 RX ORDER — ONDANSETRON 2 MG/ML
4 INJECTION INTRAMUSCULAR; INTRAVENOUS ONCE AS NEEDED
Status: DISCONTINUED | OUTPATIENT
Start: 2022-01-04 | End: 2022-01-04 | Stop reason: HOSPADM

## 2022-01-04 RX ORDER — LIDOCAINE HYDROCHLORIDE 10 MG/ML
INJECTION, SOLUTION EPIDURAL; INFILTRATION; INTRACAUDAL; PERINEURAL
Status: DISCONTINUED
Start: 2022-01-04 | End: 2022-01-04 | Stop reason: HOSPADM

## 2022-01-04 RX ORDER — METOCLOPRAMIDE HYDROCHLORIDE 5 MG/ML
10 INJECTION INTRAMUSCULAR; INTRAVENOUS ONCE AS NEEDED
Status: DISCONTINUED | OUTPATIENT
Start: 2022-01-04 | End: 2022-01-04 | Stop reason: HOSPADM

## 2022-01-04 RX ORDER — HYDROMORPHONE HCL/PF 1 MG/ML
0.5 SYRINGE (ML) INJECTION
Status: DISCONTINUED | OUTPATIENT
Start: 2022-01-04 | End: 2022-01-04 | Stop reason: HOSPADM

## 2022-01-04 RX ORDER — HYDRALAZINE HYDROCHLORIDE 20 MG/ML
5 INJECTION INTRAMUSCULAR; INTRAVENOUS
Status: DISCONTINUED | OUTPATIENT
Start: 2022-01-04 | End: 2022-01-04 | Stop reason: HOSPADM

## 2022-01-04 RX ORDER — MIDAZOLAM HYDROCHLORIDE 2 MG/2ML
INJECTION, SOLUTION INTRAMUSCULAR; INTRAVENOUS AS NEEDED
Status: DISCONTINUED | OUTPATIENT
Start: 2022-01-04 | End: 2022-01-04

## 2022-01-04 RX ORDER — OXYCODONE HYDROCHLORIDE 5 MG/1
10 TABLET ORAL EVERY 4 HOURS PRN
Status: DISCONTINUED | OUTPATIENT
Start: 2022-01-04 | End: 2022-01-04 | Stop reason: HOSPADM

## 2022-01-04 RX ORDER — FENTANYL CITRATE/PF 50 MCG/ML
25 SYRINGE (ML) INJECTION
Status: DISCONTINUED | OUTPATIENT
Start: 2022-01-04 | End: 2022-01-04 | Stop reason: HOSPADM

## 2022-01-04 RX ORDER — LABETALOL 20 MG/4 ML (5 MG/ML) INTRAVENOUS SYRINGE
10
Status: DISCONTINUED | OUTPATIENT
Start: 2022-01-04 | End: 2022-01-04 | Stop reason: HOSPADM

## 2022-01-04 RX ORDER — PROPOFOL 10 MG/ML
INJECTION, EMULSION INTRAVENOUS AS NEEDED
Status: DISCONTINUED | OUTPATIENT
Start: 2022-01-04 | End: 2022-01-04

## 2022-01-04 RX ORDER — SODIUM CHLORIDE, SODIUM LACTATE, POTASSIUM CHLORIDE, CALCIUM CHLORIDE 600; 310; 30; 20 MG/100ML; MG/100ML; MG/100ML; MG/100ML
75 INJECTION, SOLUTION INTRAVENOUS CONTINUOUS
Status: DISCONTINUED | OUTPATIENT
Start: 2022-01-04 | End: 2022-01-04 | Stop reason: HOSPADM

## 2022-01-04 RX ORDER — SODIUM CHLORIDE, SODIUM LACTATE, POTASSIUM CHLORIDE, CALCIUM CHLORIDE 600; 310; 30; 20 MG/100ML; MG/100ML; MG/100ML; MG/100ML
INJECTION, SOLUTION INTRAVENOUS CONTINUOUS PRN
Status: DISCONTINUED | OUTPATIENT
Start: 2022-01-04 | End: 2022-01-04

## 2022-01-04 RX ORDER — GLYCOPYRROLATE 0.2 MG/ML
INJECTION INTRAMUSCULAR; INTRAVENOUS AS NEEDED
Status: DISCONTINUED | OUTPATIENT
Start: 2022-01-04 | End: 2022-01-04

## 2022-01-04 RX ORDER — ALBUTEROL SULFATE 2.5 MG/3ML
2.5 SOLUTION RESPIRATORY (INHALATION) ONCE AS NEEDED
Status: DISCONTINUED | OUTPATIENT
Start: 2022-01-04 | End: 2022-01-04 | Stop reason: HOSPADM

## 2022-01-04 RX ORDER — CEFAZOLIN SODIUM 1 G/50ML
1000 SOLUTION INTRAVENOUS ONCE
Status: DISCONTINUED | OUTPATIENT
Start: 2022-01-04 | End: 2022-01-04

## 2022-01-04 RX ORDER — ACETAMINOPHEN 325 MG/1
975 TABLET ORAL ONCE
Status: COMPLETED | OUTPATIENT
Start: 2022-01-04 | End: 2022-01-04

## 2022-01-04 RX ORDER — ROCURONIUM BROMIDE 10 MG/ML
INJECTION, SOLUTION INTRAVENOUS AS NEEDED
Status: DISCONTINUED | OUTPATIENT
Start: 2022-01-04 | End: 2022-01-04

## 2022-01-04 RX ORDER — NEOSTIGMINE METHYLSULFATE 1 MG/ML
INJECTION INTRAVENOUS AS NEEDED
Status: DISCONTINUED | OUTPATIENT
Start: 2022-01-04 | End: 2022-01-04

## 2022-01-04 RX ORDER — PROMETHAZINE HYDROCHLORIDE 25 MG/ML
12.5 INJECTION, SOLUTION INTRAMUSCULAR; INTRAVENOUS ONCE AS NEEDED
Status: DISCONTINUED | OUTPATIENT
Start: 2022-01-04 | End: 2022-01-04 | Stop reason: HOSPADM

## 2022-01-04 RX ORDER — IBUPROFEN 600 MG/1
600 TABLET ORAL EVERY 6 HOURS PRN
Status: DISCONTINUED | OUTPATIENT
Start: 2022-01-04 | End: 2022-01-04 | Stop reason: HOSPADM

## 2022-01-04 RX ORDER — GABAPENTIN 100 MG/1
100 CAPSULE ORAL ONCE
Status: COMPLETED | OUTPATIENT
Start: 2022-01-04 | End: 2022-01-04

## 2022-01-04 RX ORDER — KETAMINE HYDROCHLORIDE 50 MG/ML
INJECTION, SOLUTION, CONCENTRATE INTRAMUSCULAR; INTRAVENOUS AS NEEDED
Status: DISCONTINUED | OUTPATIENT
Start: 2022-01-04 | End: 2022-01-04

## 2022-01-04 RX ADMIN — GABAPENTIN 100 MG: 100 CAPSULE ORAL at 06:00

## 2022-01-04 RX ADMIN — HEPARIN SODIUM 5000 UNITS: 5000 INJECTION INTRAVENOUS; SUBCUTANEOUS at 06:01

## 2022-01-04 RX ADMIN — GLYCOPYRROLATE 0.6 MG: 0.2 INJECTION, SOLUTION INTRAMUSCULAR; INTRAVENOUS at 09:33

## 2022-01-04 RX ADMIN — DEXAMETHASONE SODIUM PHOSPHATE 8 MG: 10 INJECTION, SOLUTION INTRAMUSCULAR; INTRAVENOUS at 09:10

## 2022-01-04 RX ADMIN — PROPOFOL 100 MG: 10 INJECTION, EMULSION INTRAVENOUS at 07:38

## 2022-01-04 RX ADMIN — SODIUM CHLORIDE, SODIUM LACTATE, POTASSIUM CHLORIDE, AND CALCIUM CHLORIDE: .6; .31; .03; .02 INJECTION, SOLUTION INTRAVENOUS at 07:35

## 2022-01-04 RX ADMIN — ROCURONIUM BROMIDE 20 MG: 50 INJECTION, SOLUTION INTRAVENOUS at 08:25

## 2022-01-04 RX ADMIN — CEFAZOLIN SODIUM 1000 MG: 2 SOLUTION INTRAVENOUS at 07:35

## 2022-01-04 RX ADMIN — NEOSTIGMINE METHYLSULFATE 3 MG: 1 INJECTION INTRAVENOUS at 09:33

## 2022-01-04 RX ADMIN — PROPOFOL 100 MCG/KG/MIN: 10 INJECTION, EMULSION INTRAVENOUS at 09:22

## 2022-01-04 RX ADMIN — ACETAMINOPHEN 975 MG: 325 TABLET, FILM COATED ORAL at 06:00

## 2022-01-04 RX ADMIN — EPHEDRINE SULFATE 5 MG: 50 INJECTION, SOLUTION INTRAVENOUS at 08:21

## 2022-01-04 RX ADMIN — SODIUM CHLORIDE, SODIUM LACTATE, POTASSIUM CHLORIDE, AND CALCIUM CHLORIDE 125 ML/HR: .6; .31; .03; .02 INJECTION, SOLUTION INTRAVENOUS at 07:02

## 2022-01-04 RX ADMIN — ONDANSETRON 4 MG: 2 INJECTION INTRAMUSCULAR; INTRAVENOUS at 09:21

## 2022-01-04 RX ADMIN — KETAMINE HYDROCHLORIDE 50 MG: 50 INJECTION, SOLUTION INTRAMUSCULAR; INTRAVENOUS at 07:38

## 2022-01-04 RX ADMIN — Medication 25 MCG: at 10:34

## 2022-01-04 RX ADMIN — Medication 25 MCG: at 10:29

## 2022-01-04 RX ADMIN — ROCURONIUM BROMIDE 50 MG: 50 INJECTION, SOLUTION INTRAVENOUS at 07:38

## 2022-01-04 RX ADMIN — SODIUM CHLORIDE: 9 INJECTION, SOLUTION INTRAVENOUS at 07:43

## 2022-01-04 RX ADMIN — SODIUM CHLORIDE, SODIUM LACTATE, POTASSIUM CHLORIDE, AND CALCIUM CHLORIDE 75 ML/HR: .6; .31; .03; .02 INJECTION, SOLUTION INTRAVENOUS at 10:31

## 2022-01-04 RX ADMIN — FENTANYL CITRATE 100 MCG: 50 INJECTION INTRAMUSCULAR; INTRAVENOUS at 07:38

## 2022-01-04 RX ADMIN — MIDAZOLAM 2 MG: 1 INJECTION INTRAMUSCULAR; INTRAVENOUS at 07:35

## 2022-01-04 NOTE — DISCHARGE INSTRUCTIONS
[facility]  Mary Berry Mins  [phone]    Hysterectomy Discharge Instructions    WHAT YOU NEED TO KNOW:   A hysterectomy is surgery to remove your uterus  Your ovaries, fallopian tubes, cervix, or part of your vagina may also need to be removed  The organs and tissue that will be removed depends on your medical condition  After a hysterectomy, you will not be able to become pregnant  If your ovaries are removed, you will go through menopause if you have not already  DISCHARGE INSTRUCTIONS:   Contact your doctor at the number above if:   · You have a fever over 101o  · You have nausea or are vomiting that does not improve after a light meal    · Your pain is getting worse, even after you take medicine  · You feel pain or burning when you urinate, or you have trouble urinating  · You have pus or a foul-smelling odor coming from your vagina  · Your wound is red, swollen, or draining pus  · You see new or an increased amount of bright red blood coming from your vagina or your incisions  · You have questions or concerns about your condition or care  Seek care immediately:   · Your arm or leg feels warm, tender, and painful  It may look swollen and red  · You have increasing abdominal or pelvic pain  · You have heavy vaginal bleeding that fills 1 or more sanitary pads in 1 hour  Call 911 for any of the following:   · You feel lightheaded, short of breath, and have chest pain  · You cough up blood  Medicines: You may need any of the following:  · Prescription medicine may be given  You may receive a prescription for pain medication or be advised to use over the counter (OTC) pain medication such as acetaminophen (Tylenol) or ibuprofen (Advil, Motrin)  Ask your healthcare provider how to take this medicine safely  · NSAIDs , such as ibuprofen, help decrease swelling, pain, and fever   NSAIDs can cause stomach bleeding or kidney problems in certain people  If you take blood thinner medicine, always ask your healthcare provider if NSAIDs are safe for you  Always read the medicine label and follow directions  · Stool softeners help treat or prevent constipation  · Take your medicine as directed  Contact your healthcare provider if you think your medicine is not helping or if you have side effects  Tell him or her if you are allergic to any medicine  Keep a list of the medicines, vitamins, and herbs you take  Include the amounts, and when and why you take them  Bring the list or the pill bottles to follow-up visits  Carry your medicine list with you in case of an emergency  Activity:   · Rest as needed  Get up and move around as directed to help prevent blood clots  Start with short walks and slowly increase the distance every day  Limit the number of times you climb stairs to 2 times each day for the first week  Plan most of your daily activities on one level of your home  · Do not lift objects heavier than 10 pounds for 6 weeks  Avoid strenuous activity for 2 weeks  · Do not strain during bowel movements  High-fiber foods and extra liquids can help you prevent constipation  Examples of high-fiber foods are fruit and bran  Prune juice and water are good liquids to drink  · Do not have sex, use tampons, or douche for up to 8 weeks  You may shower as soon as the day after surgery  Tub baths can be taken starting 2 weeks after surgery  Do not go into pools or hot tubs until cleared by your doctor  · Ask when it is safe for you to drive  It is generally safe to drive after 2 weeks and when no longer taking prescription pain medication  · Ask when you may return to work and to other regular activities  Wound care: Care for your abdominal incisions as directed  Carefully wash around the wound with soap and water   If you have Hibiclens or medicated soap that you were instructed to use before surgery, you may use that to wash with for up to 2 days after surgery  If not, any mild non-scented, non-abrasive soap is safe  It is okay to let the soap and water run over your incision  Do not scrub your incision  Dry the area and put on new, clean bandages as directed  Change your bandages when they get wet or dirty  If you have strips of medical tape, let them fall off on their own  It may take 7 to 14 days for them to fall off  Check your incision every day for redness, swelling, or pus  Deep breathing: Take deep breaths and cough 10 times each hour  This will decrease your risk for a lung infection  Take a deep breath and hold it for as long as you can  Let the air out and then cough strongly  Deep breaths help open your airway  You may be given an incentive spirometer to help you take deep breaths  Put the plastic piece in your mouth and take a slow, deep breath, then let the air out and cough  Repeat these steps 10 times every hour  Get support: This surgery may be life-changing for you and your family  You will no longer be able to get pregnant  Sudden changes in the levels of your hormones may occur and cause mood swings and depression  You may feel angry, sad, or frightened, or cry frequently and unexpectedly  These feelings are normal  Talk to your healthcare provider about where you can get support  You can also ask if hormone replacement medicine is right for you  Follow up with your healthcare provider or gynecologist as directed: You may need to return to have stitches removed, and for other tests  Write down your questions so you remember to ask them during your visits  © 2017 [address] [employer] Information is for End User's use only and may not be sold, redistributed or otherwise used for commercial purposes  All illustrations and images included in CareNotes® are the copyrighted property of [employer]  or [employer]  The above information is an  only   It is not intended as medical advice for individual conditions or treatments  Talk to your doctor, nurse or pharmacist before following any medical regimen to see if it is safe and effective for you

## 2022-01-04 NOTE — ANESTHESIA PREPROCEDURE EVALUATION
Procedure:  HYSTERECTOMY LAPAROSCOPIC TOTAL ([address]) W/ ROBOTICS, BILATERAL SALPINGO-OOPHORECTOMY (N/A Abdomen)  LAPAROTOMY EXPLORATORY W/ ROBOTICS (N/A Abdomen)    Relevant Problems   ANESTHESIA (within normal limits)      GI/HEPATIC   (+) Gastroesophageal reflux disease      Other   (+) Graves disease   (+) High grade squamous intraepithelial lesion (HGSIL), grade 3 KEYONA, on biopsy of cervix   (+) Sleep disturbance        Physical Exam    Airway    Mallampati score: II  TM Distance: >3 FB  Neck ROM: full     Dental   No notable dental hx     Cardiovascular  Rhythm: regular, Rate: normal,     Pulmonary  Pulmonary exam normal Breath sounds clear to auscultation,     Other Findings        Anesthesia Plan  ASA Score- 2     Anesthesia Type- general and regional with ASA Monitors  Additional Monitors:   Airway Plan: ETT  Plan Factors-Exercise tolerance (METS): >4 METS  Chart reviewed  EKG reviewed  Existing labs reviewed  Patient summary reviewed  Patient is a current smoker  Patient instructed to abstain from smoking on day of procedure  Patient did not smoke on day of surgery  Induction- intravenous  Postoperative Plan- Plan for postoperative opioid use  Planned trial extubation    Informed Consent- Anesthetic plan and risks discussed with patient  I personally reviewed this patient with the CRNA  Discussed and agreed on the Anesthesia Plan with the CRNA  Dara Soulier

## 2022-01-04 NOTE — OP NOTE
PERATIVE REPORT  PATIENT NAME: Rory Robles    :  1960  MRN: [id number]  Pt Location: [facility] OR ROOM 14    SURGERY DATE: 2022    Surgeon(s) and Role:     * Taj Barth MD - Primary     * GABO Marroquin-RAI - Assisting     * Jm Vallejo MD - Assisting     * Donovan Sky MD - Assisting    Preop Diagnosis:  High grade squamous intraepithelial lesion (HGSIL), grade 3 KEYONA, on biopsy of cervix [D06 9]    Post-Op Diagnosis Codes:     * High grade squamous intraepithelial lesion (HGSIL), grade 3 KEYONA, on biopsy of cervix [D06 9]    Procedure(s) (LRB):  HYSTERECTOMY LAPAROSCOPIC TOTAL ([address]) W/ ROBOTICS, BILATERAL SALPINGO-OOPHORECTOMY, LYSIS ADHESIONS (N/A)  CYSTOSCOPY (N/A)    Specimen(s):  ID Type Source Tests Collected by Time Destination   1 : left pelvic sidewall Tissue Pelvic TISSUE EXAM Taj Barth MD 2022 [id number]    2 : cervix Tissue Uterus w/Bilateral Ovaries and Fallopian Tubes TISSUE EXAM Taj Barth MD 2022 [id number]    3 : vaginal margin Tissue Vaginal TISSUE EXAM Taj Barth MD 2022 [id number]        Estimated Blood Loss:   Minimal    Drains:  [REMOVED] Urethral Catheter Double-lumen;Non-latex 16 Fr  (Removed)   Number of days: 0       Anesthesia Type:   General    Operative Indications:  High grade squamous intraepithelial lesion (HGSIL), grade 3 KEYONA, on biopsy of cervix [D59]  62year-old with KEYONA 3, positive margins from previously presents for definitive surgical management    Operative Findings:  1  Exam under anesthesia revealed a cervix flush with the vaginal apex  No parametrial disease  No palpable adnexal masses  Uterus was less than 6 weeks in size  2  On laparoscopy, there is no evidence of peritoneal disease  No upper abdominal disease  In the pelvis, there were dense adhesions present from the left adnexa to the left pelvic sidewall    The isthmic portion of the uterus was adherent to the cervix and the right Referred by: Jasson Anguiano MD; Medical Diagnosis (from order):    Diagnosis Information      Diagnosis    719.42 (ICD-9-CM) - M25.529 (ICD-10-CM) - Elbow pain                Daily Treatment Note    Visit:  2     SUBJECTIVE                                                                                                             Continues to have discomfort with straight arm activities, pain more lateral than medial  Functional Change: Some reduction in symptoms   Pain / Symptoms:  Pain/symptom is: intermittent  Pain rating (out of 10): Current: 0 ; Worst: 2Location: Lateral stiffness     OBJECTIVE                                                                                                                        TREATMENT                                                                                                                initial evaluation completed    Therapeutic Exercise:  Wrist extensor, eccentric:  2 x 10 5#  3 way forearm stretches hold 20 sec each  Bottom's up 7.5# KB supine hold 10 seconds x 3, cues for scap position   Bottom's up 7.5# KB supine twists in painfree range, 3 x 5  Continued discussion regarding activity modification    Manual Therapy:  STM and TPR R wrist extensor and flexor mm to reduce tension and pain  IASTM lateral extensors for fascial release    Skilled input: verbal instruction/cues and posture correction    Writer verbally educated and received verbal consent for hand placement, positioning of patient, and techniques to be performed today from patient for therapist position for techniques and hand placement and palpation for techniques as described above and how they are pertinent to the patient's plan of care.    Home Exercise Program: *above indicates provided as part of home exercise program     ASSESSMENT                                                                                                             Progressing well, able to tolerate full stretches  adnexa was also adherent to the right pelvic sidewall  This was secondary to endometriosis  3  Cystoscopy with bilateral jets and no evidence of bladder injury  Complications:   None    Procedure and Technique:  After informed consent was obtained, the patient was taken to the operating room where general endotracheal anesthesia was administered without incident  She was then prepped and draped in normal sterile fashion in the low dorsal lithotomy position  Examination under anesthesia was then performed with findings noted as above  A Johnson catheter was inserted  An EEA Sizer was placed in the vagina  Attention was then turned the abdomen  0 25% Marcaine was used to infiltrate the skin prior to placement of any trocar  The 1st insertion site was in the midline approximately 1 cm superior to the umbilicus  An 8 mm skin incision was made using an 11 blade scalpel  Through this was passed an 8 mm robotic trocar under direct visualization into the abdominal cavity  The abdomen is then insufflated to 15 mmHg using CO2 gas  Additional trocars then placed under direct visualization  Two 8 mm trocars were placed on left side, 1 on the right side and a 8 mm assistant port was placed in the right upper quadrant  A total of 5 trocars were utilized  The robot was docked  The bilateral retroperitoneal spaces were opened by transecting the round ligaments  The ureters were able to be identified coursing normally within the medial leaf of the broad ligaments bilaterally  Windows were made in the broad ligament above the ureter and the infundibulopelvic ligaments were skeletonized bilaterally  They were cauterized and transected with the vessel sealer  Adhesions between the left adnexa and the left pelvic sidewall were then able to be lysed  The ureter was noted to be lateral to this dissection  A portion of peritoneum was also removed during the dissection  This allowed the left adnexa to be released  In a similar fashion on the right side, adhesions between the right adnexa and the right pelvic sidewall were lysed  Again the ureter was noted to be lateral to the dissection  Adhesions between the posterior aspect of the uterus and the cervix were then lysed which allowed the uterus to be released fully  The vesicovaginal space was opened and the bladder was taken down below the level of the external os of the cervix  The bilateral uterine vessels were then skeletonized, cauterized and transected with the vessel sealer  The cardinal ligaments were then cauterized and transected  The bilateral uterosacral ligaments were thickened  These were cauterized and transected with the vessel sealer  A posterior colpotomy was then made using monopolar cautery  In order to ensure the entire cervix was removed, additional cardinal ligament bites were taken using the vessel sealer  A circumferential colpotomy was then completed using monopolar cautery  Based on evaluation of the specimen, additional anterior vaginal margin was taken and sent off for permanent section  A 2-0 Stratafix suture was then advanced into the abdominal cavity through the vagina  This was used to close the vagina in a running fashion  Hemostasis was excellent  The pelvis was irrigated  The pressure in the pelvis was brought down to less than 5 mmHg without evidence of bleeding identified  The abdomen is then re-insufflated to 15 mmHg  The robot was undocked  The Stratafix suture was then removed abdominally by removing the port 1st and then removing the suture  Gas was removed from the abdominal cavity  The trocars removed under direct visualization  The sites were closed using 4-0 Monocryl followed by Exofin  The Johnson catheter was removed  The bladder was insufflated with 200 cc of normal saline and the 5 mm laparoscope was then used as a cystoscope with findings noted as above    The Johnson catheter was then replaced to drain the bladder and then removed  The vagina was inspected  There was no evidence of bleeding identified  The patient was then awakened and transferred to the recovery room in stable condition  All instrument counts correct x2 for the procedure  No complications  Estimated blood loss is less than 50 mL      I was present for the entire procedure and A physician assistant was required during the procedure for retraction tissue handling,dissection and suturing    Patient Disposition:  PACU       SIGNATURE: Leah Hughes MD  DATE: January 4, 2022  TIME: 9:41 AM
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
## 2022-01-04 NOTE — ANESTHESIA POSTPROCEDURE EVALUATION
Post-Op Assessment Note    CV Status:  Stable  Pain Score: 0    Pain management: adequate     Mental Status:  Arousable   Hydration Status:  Euvolemic   PONV Controlled:  Controlled   Airway Patency:  Patent      Post Op Vitals Reviewed: Yes      Staff: CRNA         No complications documented      BP   114/70   Temp   97 5   Pulse  46   Resp   12   SpO2   100 on 4L fm

## 2022-01-05 ENCOUNTER — TELEPHONE (OUTPATIENT)
Dept: HEMATOLOGY ONCOLOGY | Facility: CLINIC | Age: 62
End: 2022-01-05

## 2022-01-05 NOTE — TELEPHONE ENCOUNTER
Scheduling Appointment     Who Is Calling to Schedule Patient   Doctor Dr Coralie Spatz   Location [facility]   Date and Time 01/26 at 2:45pm          Patient verbalized understanding    yes

## 2022-01-26 ENCOUNTER — OFFICE VISIT (OUTPATIENT)
Dept: GYNECOLOGIC ONCOLOGY | Facility: HOSPITAL | Age: 62
End: 2022-01-26

## 2022-01-26 VITALS
DIASTOLIC BLOOD PRESSURE: 64 MMHG | OXYGEN SATURATION: 98 % | WEIGHT: 151 LBS | RESPIRATION RATE: 17 BRPM | HEIGHT: 67 IN | HEART RATE: 78 BPM | SYSTOLIC BLOOD PRESSURE: 116 MMHG | BODY MASS INDEX: 23.7 KG/M2 | TEMPERATURE: 98.8 F

## 2022-01-26 DIAGNOSIS — D06.9 HIGH GRADE SQUAMOUS INTRAEPITHELIAL LESION (HGSIL), GRADE 3 CIN, ON BIOPSY OF CERVIX: Primary | ICD-10-CM

## 2022-01-26 PROCEDURE — 99024 POSTOP FOLLOW-UP VISIT: CPT | Performed by: OBSTETRICS & GYNECOLOGY

## 2022-01-26 NOTE — LETTER
January 26, 2022     [facility], [address]  [address] [place] [address] [address] [address]    Patient: Aleena Trevino   YOB: 1960   Date of Visit: 1/26/2022       Dear Dr Yahaira Friedman: Thank you for referring Jimmie Mcguire to me for evaluation  Below are my notes for this consultation  If you have questions, please do not hesitate to call me  I look forward to following your patient along with you  Sincerely,        Jeremiah Payton MD        CC: No Recipients  Jeremiah Payton MD  1/26/2022 12:04 PM  Sign when Signing Visit  Assessment/Plan:    Problem List Items Addressed This Visit        Other    High grade squamous intraepithelial lesion (HGSIL), grade 3 KEYONA, on biopsy of cervix - Primary     28-year-old status post robotic assisted total laparoscopic hysterectomy, bilateral salpingo-oophorectomy, adhesiolysis, cystoscopy for preoperative diagnosis of KEYONA 3  Postoperative diagnosis was consistent with KEYONA 3, negative margins  She is recovering well from surgery  Performance status is 0   1  Return in 4 weeks for postoperative pelvic examination   2  We discussed follow-up Pap smear to evaluate for vaginal dysplasia in 6 months  CHIEF COMPLAINT:  Postoperative evaluation           Patient ID: Aleena Trevino is a 62 y o  female  Returns for postoperative evaluation  She is ambulating, voiding, having bowel movements  No vaginal bleeding  She does not require any pain medication        The following portions of the patient's history were reviewed and updated as appropriate: allergies, current medications, past family history, past medical history, past social history, past surgical history and problem list     Review of Systems      Current Outpatient Medications:     Ascorbic Acid (VITAMIN C ADULT GUMMIES PO), Take by mouth 3 daily, Disp: , Rfl:     b complex vitamins tablet, Take 1 tablet by mouth daily, Disp: , Rfl:     Cholecalciferol (VITAMIN D3) 2000 units capsule, Take 1 capsule (2,000 Units total) by mouth daily (Patient taking differently: Take by mouth daily 2 daily), Disp: 30 capsule, Rfl: 5    loratadine (CLARITIN) 10 mg tablet, Take 10 mg by mouth daily  , Disp: , Rfl:     Multiple Vitamin (MULTIVITAMIN) tablet, Take 2 tablets by mouth daily , Disp: , Rfl:     pantoprazole (PROTONIX) 40 mg tablet, Take 1 tablet (40 mg total) by mouth daily, Disp: 90 tablet, Rfl: 3    ZINC OXIDE PO, Take by mouth 3 daily  (Power zinc), Disp: , Rfl:     Biotin 1 MG CAPS, Take by mouth, Disp: , Rfl:     Objective:    Blood pressure 116/64, pulse 78, temperature 98 8 °F (37 1 °C), temperature source Temporal, resp  rate 17, height 5' 7" (1 702 m), weight 68 5 kg (151 lb), SpO2 98 %  Body mass index is 23 65 kg/m²  Body surface area is 1 79 meters squared  Physical Exam  Vitals reviewed  Constitutional:       General: She is not in acute distress  Appearance: Normal appearance  HENT:      Head: Normocephalic and atraumatic  Pulmonary:      Effort: Pulmonary effort is normal    Abdominal:      Palpations: Abdomen is soft  There is no mass  Tenderness: There is no abdominal tenderness  Skin:     General: Skin is warm and dry  Comments: Surgical trocar sites are intact, clean and dry without induration, erythema or purulent drainage  Neurological:      Mental Status: She is alert and oriented to person, place, and time  Mental status is at baseline  Psychiatric:         Mood and Affect: Mood normal          Behavior: Behavior normal          Thought Content:  Thought content normal          Judgment: Judgment normal

## 2022-01-26 NOTE — PROGRESS NOTES
Assessment/Plan:    Problem List Items Addressed This Visit        Other    High grade squamous intraepithelial lesion (HGSIL), grade 3 KEYONA, on biopsy of cervix - Primary     71-year-old status post robotic assisted total laparoscopic hysterectomy, bilateral salpingo-oophorectomy, adhesiolysis, cystoscopy for preoperative diagnosis of KEYONA 3  Postoperative diagnosis was consistent with KEYONA 3, negative margins  She is recovering well from surgery  Performance status is 0   1  Return in 4 weeks for postoperative pelvic examination   2  We discussed follow-up Pap smear to evaluate for vaginal dysplasia in 6 months  CHIEF COMPLAINT:  Postoperative evaluation           Patient ID: Alise Paniagua is a 62 y o  female  Returns for postoperative evaluation  She is ambulating, voiding, having bowel movements  No vaginal bleeding  She does not require any pain medication        The following portions of the patient's history were reviewed and updated as appropriate: allergies, current medications, past family history, past medical history, past social history, past surgical history and problem list     Review of Systems      Current Outpatient Medications:     Ascorbic Acid (VITAMIN C ADULT GUMMIES PO), Take by mouth 3 daily, Disp: , Rfl:     b complex vitamins tablet, Take 1 tablet by mouth daily, Disp: , Rfl:     Cholecalciferol (VITAMIN D3) 2000 units capsule, Take 1 capsule (2,000 Units total) by mouth daily (Patient taking differently: Take by mouth daily 2 daily), Disp: 30 capsule, Rfl: 5    loratadine (CLARITIN) 10 mg tablet, Take 10 mg by mouth daily  , Disp: , Rfl:     Multiple Vitamin (MULTIVITAMIN) tablet, Take 2 tablets by mouth daily , Disp: , Rfl:     pantoprazole (PROTONIX) 40 mg tablet, Take 1 tablet (40 mg total) by mouth daily, Disp: 90 tablet, Rfl: 3    ZINC OXIDE PO, Take by mouth 3 daily  (Power zinc), Disp: , Rfl:     Biotin 1 MG CAPS, Take by mouth, Disp: , Rfl: Objective:    Blood pressure 116/64, pulse 78, temperature 98 8 °F (37 1 °C), temperature source Temporal, resp  rate 17, height 5' 7" (1 702 m), weight 68 5 kg (151 lb), SpO2 98 %  Body mass index is 23 65 kg/m²  Body surface area is 1 79 meters squared  Physical Exam  Vitals reviewed  Constitutional:       General: She is not in acute distress  Appearance: Normal appearance  HENT:      Head: Normocephalic and atraumatic  Pulmonary:      Effort: Pulmonary effort is normal    Abdominal:      Palpations: Abdomen is soft  There is no mass  Tenderness: There is no abdominal tenderness  Skin:     General: Skin is warm and dry  Comments: Surgical trocar sites are intact, clean and dry without induration, erythema or purulent drainage  Neurological:      Mental Status: She is alert and oriented to person, place, and time  Mental status is at baseline  Psychiatric:         Mood and Affect: Mood normal          Behavior: Behavior normal          Thought Content:  Thought content normal          Judgment: Judgment normal

## 2022-01-26 NOTE — ASSESSMENT & PLAN NOTE
71-year-old status post robotic assisted total laparoscopic hysterectomy, bilateral salpingo-oophorectomy, adhesiolysis, cystoscopy for preoperative diagnosis of KEYONA 3  Postoperative diagnosis was consistent with KEYONA 3, negative margins  She is recovering well from surgery  Performance status is 0   1  Return in 4 weeks for postoperative pelvic examination   2  We discussed follow-up Pap smear to evaluate for vaginal dysplasia in 6 months

## 2022-02-24 ENCOUNTER — OFFICE VISIT (OUTPATIENT)
Dept: GYNECOLOGIC ONCOLOGY | Facility: HOSPITAL | Age: 62
End: 2022-02-24

## 2022-02-24 VITALS
WEIGHT: 154.8 LBS | SYSTOLIC BLOOD PRESSURE: 118 MMHG | OXYGEN SATURATION: 97 % | HEIGHT: 67 IN | DIASTOLIC BLOOD PRESSURE: 64 MMHG | RESPIRATION RATE: 17 BRPM | HEART RATE: 75 BPM | BODY MASS INDEX: 24.3 KG/M2 | TEMPERATURE: 98.5 F

## 2022-02-24 DIAGNOSIS — D06.9 HIGH GRADE SQUAMOUS INTRAEPITHELIAL LESION (HGSIL), GRADE 3 CIN, ON BIOPSY OF CERVIX: Primary | ICD-10-CM

## 2022-02-24 DIAGNOSIS — Z98.890 S/P ROBOT-ASSISTED SURGICAL PROCEDURE: ICD-10-CM

## 2022-02-24 PROCEDURE — 99024 POSTOP FOLLOW-UP VISIT: CPT | Performed by: PHYSICIAN ASSISTANT

## 2022-02-24 NOTE — ASSESSMENT & PLAN NOTE
32-year-old s/p robotic-assisted TLH, BSO, lysis of adhesions on 1/4/22 for a pre-op diagnosis of KEYONA 3  Her final pathology was consistent with KEYONA 3  She has recovered well from surgery  Her vaginal cuff is well healed  Return to the office in 6 months for PAP smear  Patient is able to resume all normal activity

## 2022-02-24 NOTE — PROGRESS NOTES
Assessment/Plan:    Problem List Items Addressed This Visit        Other    High grade squamous intraepithelial lesion (HGSIL), grade 3 KEYONA, on biopsy of cervix - Primary     59-year-old s/p robotic-assisted TLH, BSO, lysis of adhesions on 1/4/22 for a pre-op diagnosis of KEYONA 3  Her final pathology was consistent with KEYONA 3  She has recovered well from surgery  Her vaginal cuff is well healed  Return to the office in 6 months for PAP smear  Patient is able to resume all normal activity  S/P robot-assisted surgical procedure            CHIEF COMPLAINT:   Post-operative evaluation    Problem:  KEYONA 3    Previous therapy:  1  LEEP, 8/18/21  2  Robotic-assisted TLH, BSO, lysis of adhesions and cystoscopy on 1/4/22  A  KEYONA 3, no malignancy      Patient ID: Mei Abdi is a 62 y o  female  who presents to the office for post-operative evaluation  She has recovered well from surgery  She is without acute complaints  She denies abdominal/pelvic pain  Normal bowel/bladder function  NO vaginal bleeding  The following portions of the patient's history were reviewed and updated as appropriate: allergies, current medications, past medical history, past surgical history and problem list     Review of Systems   Constitutional: Negative  Respiratory: Negative  Cardiovascular: Negative  Gastrointestinal: Negative  Genitourinary: Negative  Skin: Negative          Current Outpatient Medications   Medication Sig Dispense Refill    Ascorbic Acid (VITAMIN C ADULT GUMMIES PO) Take by mouth 3 daily      Cholecalciferol (VITAMIN D3) 2000 units capsule Take 1 capsule (2,000 Units total) by mouth daily (Patient taking differently: Take by mouth daily 2 daily) 30 capsule 5    loratadine (CLARITIN) 10 mg tablet Take 10 mg by mouth daily        pantoprazole (PROTONIX) 40 mg tablet Take 1 tablet (40 mg total) by mouth daily 90 tablet 3    ZINC OXIDE PO Take by mouth 3 daily  (Power zinc)      b complex vitamins tablet Take 1 tablet by mouth daily      Biotin 1 MG CAPS Take by mouth      Multiple Vitamin (MULTIVITAMIN) tablet Take 2 tablets by mouth daily  (Patient not taking: Reported on 2/24/2022 )       No current facility-administered medications for this visit  Objective:    Blood pressure 118/64, pulse 75, temperature 98 5 °F (36 9 °C), temperature source Probe, resp  rate 17, height 5' 7" (1 702 m), weight 70 2 kg (154 lb 12 8 oz), SpO2 97 %  Body mass index is 24 25 kg/m²  Body surface area is 1 81 meters squared  Physical Exam  Vitals reviewed  Exam conducted with a chaperone present  Constitutional:       General: She is not in acute distress  Appearance: Normal appearance  HENT:      Head: Normocephalic and atraumatic  Mouth/Throat:      Mouth: Mucous membranes are moist    Abdominal:      Palpations: Abdomen is soft  There is no mass  Tenderness: There is no abdominal tenderness  Genitourinary:     Comments: The external female genitalia is normal  The bartholin's, uretheral and skenes glands are normal  The urethral meatus is normal (midline with no lesions)  Anus without fissure or lesion  Speculum exam reveals a grossly normal vagina  Vagina is intact, without dehiscense  No masses, lesions, discharge or bleeding  No significant cystocele or rectocele noted  Bimanual exam notes a surgical absent cervix, uterus and adnexal structures  No masses or fullness  Bladder is without fullness, mass or tenderness  Skin:     General: Skin is warm and dry  Comments: Surgical trocar sites are well healed  Neurological:      Mental Status: She is alert and oriented to person, place, and time  Psychiatric:         Mood and Affect: Mood normal          Behavior: Behavior normal          Thought Content:  Thought content normal          Judgment: Judgment normal

## 2022-03-15 ENCOUNTER — HOSPITAL ENCOUNTER (OUTPATIENT)
Dept: MAMMOGRAPHY | Facility: CLINIC | Age: 62
Discharge: HOME | End: 2022-03-15
Payer: OTHER GOVERNMENT

## 2022-03-15 VITALS — HEIGHT: 67 IN | BODY MASS INDEX: 24.29 KG/M2 | WEIGHT: 154.76 LBS

## 2022-03-15 DIAGNOSIS — Z12.31 SCREENING MAMMOGRAM FOR HIGH-RISK PATIENT: ICD-10-CM

## 2022-03-15 PROCEDURE — 77067 SCR MAMMO BI INCL CAD: CPT

## 2022-03-15 PROCEDURE — 77063 BREAST TOMOSYNTHESIS BI: CPT

## 2022-08-04 ENCOUNTER — OFFICE VISIT (OUTPATIENT)
Dept: FAMILY MEDICINE CLINIC | Facility: HOSPITAL | Age: 62
End: 2022-08-04
Payer: COMMERCIAL

## 2022-08-04 VITALS
WEIGHT: 154 LBS | HEIGHT: 67 IN | DIASTOLIC BLOOD PRESSURE: 82 MMHG | SYSTOLIC BLOOD PRESSURE: 130 MMHG | OXYGEN SATURATION: 97 % | HEART RATE: 80 BPM | RESPIRATION RATE: 16 BRPM | BODY MASS INDEX: 24.17 KG/M2

## 2022-08-04 DIAGNOSIS — Z00.00 ANNUAL PHYSICAL EXAM: Primary | ICD-10-CM

## 2022-08-04 DIAGNOSIS — Z13.6 SCREENING FOR CARDIOVASCULAR CONDITION: ICD-10-CM

## 2022-08-04 DIAGNOSIS — Z13.1 SCREENING FOR DIABETES MELLITUS: ICD-10-CM

## 2022-08-04 DIAGNOSIS — G47.19 EXCESSIVE DAYTIME SLEEPINESS: ICD-10-CM

## 2022-08-04 DIAGNOSIS — Z23 ENCOUNTER FOR IMMUNIZATION: ICD-10-CM

## 2022-08-04 DIAGNOSIS — Z20.822 EXPOSURE TO COVID-19 VIRUS: ICD-10-CM

## 2022-08-04 DIAGNOSIS — E05.00 GRAVES DISEASE: ICD-10-CM

## 2022-08-04 DIAGNOSIS — K21.9 GASTROESOPHAGEAL REFLUX DISEASE WITHOUT ESOPHAGITIS: ICD-10-CM

## 2022-08-04 PROCEDURE — 90715 TDAP VACCINE 7 YRS/> IM: CPT

## 2022-08-04 PROCEDURE — 99396 PREV VISIT EST AGE 40-64: CPT | Performed by: INTERNAL MEDICINE

## 2022-08-04 PROCEDURE — 90471 IMMUNIZATION ADMIN: CPT

## 2022-08-04 RX ORDER — PANTOPRAZOLE SODIUM 40 MG/1
40 TABLET, DELAYED RELEASE ORAL DAILY
Qty: 90 TABLET | Refills: 3 | Status: SHIPPED | OUTPATIENT
Start: 2022-08-04 | End: 2022-08-09 | Stop reason: SDUPTHER
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
## 2022-08-04 NOTE — PATIENT INSTRUCTIONS

## 2022-08-09 DIAGNOSIS — K21.9 GASTROESOPHAGEAL REFLUX DISEASE WITHOUT ESOPHAGITIS: ICD-10-CM

## 2022-08-09 LAB
ALBUMIN SERPL-MCNC: 4.6 G/DL (ref 3.8–4.8)
ALBUMIN/GLOB SERPL: 2.6 {RATIO} (ref 1.2–2.2)
ALP SERPL-CCNC: 71 IU/L (ref 44–121)
ALT SERPL-CCNC: 21 IU/L (ref 0–32)
AST SERPL-CCNC: 22 IU/L (ref 0–40)
BILIRUB SERPL-MCNC: 0.3 MG/DL (ref 0–1.2)
BUN SERPL-MCNC: 14 MG/DL (ref 8–27)
BUN/CREAT SERPL: 14 (ref 12–28)
CALCIUM SERPL-MCNC: 9.6 MG/DL (ref 8.7–10.3)
CHLORIDE SERPL-SCNC: 101 MMOL/L (ref 96–106)
CHOLEST SERPL-MCNC: 256 MG/DL (ref 100–199)
CHOLEST/HDLC SERPL: 3.7 RATIO (ref 0–4.4)
CO2 SERPL-SCNC: 23 MMOL/L (ref 20–29)
CREAT SERPL-MCNC: 1.03 MG/DL (ref 0.57–1)
EGFR: 62 ML/MIN/1.73
GLOBULIN SER-MCNC: 1.8 G/DL (ref 1.5–4.5)
GLUCOSE SERPL-MCNC: 92 MG/DL (ref 65–99)
HDLC SERPL-MCNC: 70 MG/DL
LDLC SERPL CALC-MCNC: 155 MG/DL (ref 0–99)
POTASSIUM SERPL-SCNC: 4.2 MMOL/L (ref 3.5–5.2)
PROT SERPL-MCNC: 6.4 G/DL (ref 6–8.5)
SARS-COV-2 SEMI-QUANT IGG AB: <13 AU/ML
SARS-COV-2 SPIKE AB INTERP CONTAINING ATTACHED ABBREV COVDSN: NEGATIVE
SL AMB VLDL CHOLESTEROL CALC: 31 MG/DL (ref 5–40)
SODIUM SERPL-SCNC: 139 MMOL/L (ref 134–144)
TRIGL SERPL-MCNC: 174 MG/DL (ref 0–149)
TSH SERPL DL<=0.005 MIU/L-ACNC: 1.79 UIU/ML (ref 0.45–4.5)

## 2022-08-09 RX ORDER — PANTOPRAZOLE SODIUM 40 MG/1
40 TABLET, DELAYED RELEASE ORAL DAILY
Qty: 90 TABLET | Refills: 3 | Status: SHIPPED | OUTPATIENT
Start: 2022-08-09 | End: 2022-10-03 | Stop reason: SDUPTHER

## 2022-08-10 ENCOUNTER — TELEPHONE (OUTPATIENT)
Dept: SLEEP CENTER | Facility: CLINIC | Age: 62
End: 2022-08-10

## 2022-08-10 NOTE — TELEPHONE ENCOUNTER
----- Message from Lennie Reynaga MD sent at 8/10/2022  3:06 PM EDT -----  Approved    ----- Message -----  From: Tracee Jorgensen  Sent: 8/27/0710   9:37 AM EDT  To: Sleep Medicine [facility] Provider    This Home sleep study needs approval      If approved please sign and return to clerical pool  If denied please include reasons why  Also provide alternative testing if warranted  Please sign and return to clerical pool

## 2022-08-10 NOTE — RESULT ENCOUNTER NOTE
Call patient: Labs Show high cholesterol, please avoid fatty food, I can refer her to dietitian if she accepts it  The covid blood test is negative suggesting that she has not had covid infection yet   Electrolytes, blood sugar, liver functions and thyroid functions are fine

## 2022-08-25 ENCOUNTER — OFFICE VISIT (OUTPATIENT)
Dept: GYNECOLOGIC ONCOLOGY | Facility: HOSPITAL | Age: 62
End: 2022-08-25
Payer: COMMERCIAL

## 2022-08-25 VITALS
HEART RATE: 78 BPM | DIASTOLIC BLOOD PRESSURE: 84 MMHG | OXYGEN SATURATION: 97 % | WEIGHT: 155.2 LBS | HEIGHT: 67 IN | SYSTOLIC BLOOD PRESSURE: 120 MMHG | BODY MASS INDEX: 24.36 KG/M2 | RESPIRATION RATE: 16 BRPM | TEMPERATURE: 98.7 F

## 2022-08-25 DIAGNOSIS — D06.9 HIGH GRADE SQUAMOUS INTRAEPITHELIAL LESION (HGSIL), GRADE 3 CIN, ON BIOPSY OF CERVIX: Primary | ICD-10-CM

## 2022-08-25 PROCEDURE — 88175 CYTOPATH C/V AUTO FLUID REDO: CPT | Performed by: PATHOLOGY

## 2022-08-25 PROCEDURE — 99212 OFFICE O/P EST SF 10 MIN: CPT | Performed by: PHYSICIAN ASSISTANT

## 2022-08-25 PROCEDURE — 87624 HPV HI-RISK TYP POOLED RSLT: CPT | Performed by: PHYSICIAN ASSISTANT

## 2022-08-25 NOTE — ASSESSMENT & PLAN NOTE
76-year-old s/p robotic-assisted TLH, BSO, lysis of adhesions on 1/4/22 for a pre-op diagnosis of KEYONA 3  Her final pathology was consistent with KEYONA 3  Six-month repeat PAP smear performed today      I will call the patient with the results  She will make a provisional appointment to return to the office in 1 year for PAP smear follow-up

## 2022-08-26 LAB
HPV HR 12 DNA CVX QL NAA+PROBE: NEGATIVE
HPV16 DNA CVX QL NAA+PROBE: NEGATIVE
HPV18 DNA CVX QL NAA+PROBE: NEGATIVE

## 2022-09-01 LAB
LAB AP GYN PRIMARY INTERPRETATION: ABNORMAL
Lab: ABNORMAL
PATH INTERP SPEC-IMP: ABNORMAL

## 2022-10-03 DIAGNOSIS — K21.9 GASTROESOPHAGEAL REFLUX DISEASE WITHOUT ESOPHAGITIS: ICD-10-CM

## 2022-10-03 RX ORDER — PANTOPRAZOLE SODIUM 40 MG/1
40 TABLET, DELAYED RELEASE ORAL DAILY
Qty: 90 TABLET | Refills: 3 | Status: SHIPPED | OUTPATIENT
Start: 2022-10-03 | End: 2022-10-05 | Stop reason: SDUPTHER

## 2022-10-05 DIAGNOSIS — K21.9 GASTROESOPHAGEAL REFLUX DISEASE WITHOUT ESOPHAGITIS: ICD-10-CM

## 2022-10-05 RX ORDER — PANTOPRAZOLE SODIUM 40 MG/1
40 TABLET, DELAYED RELEASE ORAL DAILY
Qty: 90 TABLET | Refills: 3 | Status: SHIPPED | OUTPATIENT
Start: 2022-10-05 | End: 2022-10-28 | Stop reason: SDUPTHER

## 2022-10-28 ENCOUNTER — TELEPHONE (OUTPATIENT)
Dept: FAMILY MEDICINE CLINIC | Facility: HOSPITAL | Age: 62
End: 2022-10-28

## 2022-10-28 DIAGNOSIS — K21.9 GASTROESOPHAGEAL REFLUX DISEASE WITHOUT ESOPHAGITIS: ICD-10-CM

## 2022-10-28 RX ORDER — PANTOPRAZOLE SODIUM 40 MG/1
40 TABLET, DELAYED RELEASE ORAL DAILY
Qty: 90 TABLET | Refills: 3 | Status: SHIPPED | OUTPATIENT
Start: 2022-10-28

## 2022-12-21 ENCOUNTER — HOSPITAL ENCOUNTER (OUTPATIENT)
Dept: SLEEP CENTER | Facility: HOSPITAL | Age: 62
Discharge: HOME | End: 2022-12-21
Attending: INTERNAL MEDICINE

## 2022-12-21 DIAGNOSIS — G47.19 EXCESSIVE DAYTIME SLEEPINESS: ICD-10-CM

## 2022-12-24 NOTE — PROGRESS NOTES
Home Sleep Study Documentation    HOME STUDY DEVICE: Noxturnal no                                           Mary Beth G3 yes      Pre-Sleep Home Study:    Set-up and instructions performed by: Pavan Chandler performed demonstration for Patient: yes    Return demonstration performed by Patient: yes    Written instructions provided to Patient: yes    Patient signed consent form: yes        Post-Sleep Home Study:    Additional comments by Patient: NONE    Home Sleep Study Failed:no:    Failure reason: N/A    Reported or Detected: N/A    Scored by:  CELINA Yeh

## 2023-06-07 ENCOUNTER — HOSPITAL ENCOUNTER (OUTPATIENT)
Dept: MAMMOGRAPHY | Facility: CLINIC | Age: 63
Discharge: HOME | End: 2023-06-07
Payer: COMMERCIAL

## 2023-06-07 VITALS — HEIGHT: 67 IN | BODY MASS INDEX: 24.36 KG/M2 | WEIGHT: 155.2 LBS

## 2023-06-07 DIAGNOSIS — Z12.31 ENCOUNTER FOR SCREENING MAMMOGRAM FOR MALIGNANT NEOPLASM OF BREAST: ICD-10-CM

## 2023-06-07 PROCEDURE — 77067 SCR MAMMO BI INCL CAD: CPT

## 2023-06-07 PROCEDURE — 77063 BREAST TOMOSYNTHESIS BI: CPT

## 2023-06-15 ENCOUNTER — OFFICE VISIT (OUTPATIENT)
Dept: FAMILY MEDICINE CLINIC | Facility: HOSPITAL | Age: 63
End: 2023-06-15
Payer: COMMERCIAL

## 2023-06-15 VITALS
HEART RATE: 80 BPM | HEIGHT: 67 IN | SYSTOLIC BLOOD PRESSURE: 118 MMHG | DIASTOLIC BLOOD PRESSURE: 80 MMHG | WEIGHT: 158 LBS | RESPIRATION RATE: 16 BRPM | OXYGEN SATURATION: 97 % | BODY MASS INDEX: 24.8 KG/M2

## 2023-06-15 DIAGNOSIS — E78.2 MIXED HYPERLIPIDEMIA: ICD-10-CM

## 2023-06-15 DIAGNOSIS — R10.31 RIGHT LOWER QUADRANT ABDOMINAL PAIN: Primary | ICD-10-CM

## 2023-06-15 DIAGNOSIS — K21.9 GASTROESOPHAGEAL REFLUX DISEASE WITHOUT ESOPHAGITIS: ICD-10-CM

## 2023-06-15 PROCEDURE — 99214 OFFICE O/P EST MOD 30 MIN: CPT | Performed by: INTERNAL MEDICINE

## 2023-06-15 RX ORDER — PANTOPRAZOLE SODIUM 40 MG/1
40 TABLET, DELAYED RELEASE ORAL 2 TIMES DAILY
Qty: 90 TABLET | Refills: 3
Start: 2023-06-15

## 2023-06-15 NOTE — PROGRESS NOTES
Assessment/Plan:    Right lower quadrant abdominal pain  Patient presents with a chief complaint of a deep, constant, nagging type of abdominal pain on the right lower side of her abdomen that sometimes radiates towards her right hip  This has been present at least for 3 months and is not getting any better  It is slightly better when she leans to the left or if she has a bowel movement and is worse with certain motions  It is not worse with meals  She has had no fevers, weight loss, nausea, vomiting, diarrhea, dysuria, signs of GI or  bleeding  She status post hysterectomy and oophorectomy  Her colonoscopy in 2018 showed 1 polyp, diverticuli  She has right lower quadrant abdominal tenderness on examination  I did not feel masses  I ordered CBC with differential, CMP, urinalysis and CT abdomen pelvis for further evaluation of the constant right lower quadrant abdominal discomfort  Gastroesophageal reflux disease without esophagitis  She has GERD  She had no Garcia's esophagus in 2015  Her GERD is worse the last couple of weeks  Denies dysphagia or signs of GI bleeding  Pepcid did not help her before    I asked her to increase her pantoprazole to 40 g p o  twice daily for a month to see if that alleviates her heartburn and acid reflux  If not I will refer her to GI    Mixed hyperlipidemia  She had hyperlipidemia last year  Recheck lipid profile         Diagnoses and all orders for this visit:    Right lower quadrant abdominal pain  -     CBC and differential; Future  -     Comprehensive metabolic panel; Future  -     UA/M w/rflx Culture, Routine  -     CT abdomen pelvis w contrast; Future  -     CBC and differential  -     Comprehensive metabolic panel    Mixed hyperlipidemia  -     Lipid panel; Future  -     Lipid panel    Gastroesophageal reflux disease without esophagitis  -     pantoprazole (PROTONIX) 40 mg tablet;  Take 1 tablet (40 mg total) by mouth 2 (two) times a day "    Subjective:      Patient ID: Namita Montana is a 65 y o  female  HPI    Patient presents for follow-up of chronic conditions as detailed in the assessment and plan  The following portions of the patient's history were reviewed and updated as appropriate: current medications, past family history, past medical history, past social history, past surgical history and problem list     Review of Systems      Objective:    /80   Pulse 80   Resp 16   Ht 5' 7\" (1 702 m)   Wt 71 7 kg (158 lb)   LMP  (LMP Unknown)   SpO2 97%   BMI 24 75 kg/m²      Physical Exam  Constitutional:       General: She is not in acute distress  Appearance: She is not toxic-appearing  Cardiovascular:      Rate and Rhythm: Normal rate and regular rhythm  Pulmonary:      Effort: No respiratory distress  Breath sounds: No wheezing or rales  Abdominal:      General: Bowel sounds are normal       Palpations: Abdomen is soft  There is no mass  Tenderness: There is abdominal tenderness  There is no right CVA tenderness, left CVA tenderness or guarding  Musculoskeletal:      Comments: She has no spinous process tenderness  She has no tenderness of her rib cage on palpation  Straight leg raising was negative  She had range of motion in the right hip  Right hip was not tender on palpation laterally   Skin:     General: Skin is warm and dry  Findings: No rash  Neurological:      Mental Status: She is alert and oriented to person, place, and time  Motor: No weakness  Gait: Gait normal    Psychiatric:         Mood and Affect: Mood normal          Thought Content: Thought content normal            Depression Screening and Follow-up Plan: Patient was screened for depression during today's encounter  They screened negative with a PHQ-2 score of 0          Tommy Knox MD  "

## 2023-06-15 NOTE — ASSESSMENT & PLAN NOTE
She has GERD  She had no Garcia's esophagus in 2015  Her GERD is worse the last couple of weeks  Denies dysphagia or signs of GI bleeding  Pepcid did not help her before    I asked her to increase her pantoprazole to 40 g p o  twice daily for a month to see if that alleviates her heartburn and acid reflux    If not I will refer her to GI

## 2023-06-15 NOTE — ASSESSMENT & PLAN NOTE
Patient presents with a chief complaint of a deep, constant, nagging type of abdominal pain on the right lower side of her abdomen that sometimes radiates towards her right hip  This has been present at least for 3 months and is not getting any better  It is slightly better when she leans to the left or if she has a bowel movement and is worse with certain motions  It is not worse with meals  She has had no fevers, weight loss, nausea, vomiting, diarrhea, dysuria, signs of GI or  bleeding  She status post hysterectomy and oophorectomy  Her colonoscopy in 2018 showed 1 polyp, diverticuli  She has right lower quadrant abdominal tenderness on examination  I did not feel masses  I ordered CBC with differential, CMP, urinalysis and CT abdomen pelvis for further evaluation of the constant right lower quadrant abdominal discomfort

## 2023-06-21 LAB
ALBUMIN SERPL-MCNC: 4.6 G/DL (ref 3.8–4.8)
ALBUMIN/GLOB SERPL: 2.3 {RATIO} (ref 1.2–2.2)
ALP SERPL-CCNC: 63 IU/L (ref 44–121)
ALT SERPL-CCNC: 19 IU/L (ref 0–32)
AST SERPL-CCNC: 23 IU/L (ref 0–40)
BASOPHILS # BLD AUTO: 0 X10E3/UL (ref 0–0.2)
BASOPHILS NFR BLD AUTO: 0 %
BILIRUB SERPL-MCNC: 0.5 MG/DL (ref 0–1.2)
BUN SERPL-MCNC: 9 MG/DL (ref 8–27)
BUN/CREAT SERPL: 11 (ref 12–28)
CALCIUM SERPL-MCNC: 9.8 MG/DL (ref 8.7–10.3)
CHLORIDE SERPL-SCNC: 103 MMOL/L (ref 96–106)
CHOLEST SERPL-MCNC: 279 MG/DL (ref 100–199)
CHOLEST/HDLC SERPL: 3.4 RATIO (ref 0–4.4)
CO2 SERPL-SCNC: 26 MMOL/L (ref 20–29)
CREAT SERPL-MCNC: 0.84 MG/DL (ref 0.57–1)
EGFR: 79 ML/MIN/1.73
EOSINOPHIL # BLD AUTO: 0.1 X10E3/UL (ref 0–0.4)
EOSINOPHIL NFR BLD AUTO: 2 %
ERYTHROCYTE [DISTWIDTH] IN BLOOD BY AUTOMATED COUNT: 12.8 % (ref 11.7–15.4)
GLOBULIN SER-MCNC: 2 G/DL (ref 1.5–4.5)
GLUCOSE SERPL-MCNC: 94 MG/DL (ref 70–99)
HCT VFR BLD AUTO: 43 % (ref 34–46.6)
HDLC SERPL-MCNC: 81 MG/DL
HGB BLD-MCNC: 14.9 G/DL (ref 11.1–15.9)
IMM GRANULOCYTES # BLD: 0 X10E3/UL (ref 0–0.1)
IMM GRANULOCYTES NFR BLD: 0 %
LDLC SERPL CALC-MCNC: 170 MG/DL (ref 0–99)
LYMPHOCYTES # BLD AUTO: 1.6 X10E3/UL (ref 0.7–3.1)
LYMPHOCYTES NFR BLD AUTO: 31 %
MCH RBC QN AUTO: 30.5 PG (ref 26.6–33)
MCHC RBC AUTO-ENTMCNC: 34.7 G/DL (ref 31.5–35.7)
MCV RBC AUTO: 88 FL (ref 79–97)
MONOCYTES # BLD AUTO: 0.6 X10E3/UL (ref 0.1–0.9)
MONOCYTES NFR BLD AUTO: 12 %
NEUTROPHILS # BLD AUTO: 2.8 X10E3/UL (ref 1.4–7)
NEUTROPHILS NFR BLD AUTO: 55 %
PLATELET # BLD AUTO: 246 X10E3/UL (ref 150–450)
POTASSIUM SERPL-SCNC: 4.1 MMOL/L (ref 3.5–5.2)
PROT SERPL-MCNC: 6.6 G/DL (ref 6–8.5)
RBC # BLD AUTO: 4.89 X10E6/UL (ref 3.77–5.28)
SL AMB VLDL CHOLESTEROL CALC: 28 MG/DL (ref 5–40)
SODIUM SERPL-SCNC: 141 MMOL/L (ref 134–144)
TRIGL SERPL-MCNC: 159 MG/DL (ref 0–149)
WBC # BLD AUTO: 5.1 X10E3/UL (ref 3.4–10.8)

## 2023-06-23 ENCOUNTER — HOSPITAL ENCOUNTER (OUTPATIENT)
Dept: CT IMAGING | Facility: HOSPITAL | Age: 63
Discharge: HOME | End: 2023-06-23
Attending: INTERNAL MEDICINE
Payer: COMMERCIAL

## 2023-06-23 DIAGNOSIS — R10.31 RIGHT LOWER QUADRANT ABDOMINAL PAIN: ICD-10-CM

## 2023-06-23 PROCEDURE — G1004 CDSM NDSC: HCPCS

## 2023-06-23 PROCEDURE — 74177 CT ABD & PELVIS W/CONTRAST: CPT

## 2023-06-23 RX ADMIN — IOHEXOL 100 ML: 350 INJECTION, SOLUTION INTRAVENOUS at 06:55

## 2023-08-30 ENCOUNTER — TELEPHONE (OUTPATIENT)
Dept: HEMATOLOGY ONCOLOGY | Facility: CLINIC | Age: 63
End: 2023-08-30

## 2023-08-30 NOTE — TELEPHONE ENCOUNTER
Appointment Confirmation   Who are you speaking with? Patient   If it is not the patient, are they listed on an active communication consent form? N/A   Which provider is the appointment scheduled with? Dr. Juan Miguel Elizabeth   When is the appointment scheduled? Please list date and time 08/31/2023 @1PM    At which location is the appointment scheduled to take place? [place]   Did caller verbalize understanding of appointment details?  Yes

## 2023-08-31 ENCOUNTER — OFFICE VISIT (OUTPATIENT)
Age: 63
End: 2023-08-31
Payer: COMMERCIAL

## 2023-08-31 VITALS
DIASTOLIC BLOOD PRESSURE: 88 MMHG | SYSTOLIC BLOOD PRESSURE: 116 MMHG | WEIGHT: 158.8 LBS | RESPIRATION RATE: 16 BRPM | HEIGHT: 67 IN | TEMPERATURE: 94.8 F | OXYGEN SATURATION: 96 % | HEART RATE: 75 BPM | BODY MASS INDEX: 24.92 KG/M2

## 2023-08-31 DIAGNOSIS — D06.9 HIGH GRADE SQUAMOUS INTRAEPITHELIAL LESION (HGSIL), GRADE 3 CIN, ON BIOPSY OF CERVIX: Primary | ICD-10-CM

## 2023-08-31 PROCEDURE — 87624 HPV HI-RISK TYP POOLED RSLT: CPT | Performed by: OBSTETRICS & GYNECOLOGY

## 2023-08-31 PROCEDURE — 88175 CYTOPATH C/V AUTO FLUID REDO: CPT | Performed by: OBSTETRICS & GYNECOLOGY

## 2023-08-31 PROCEDURE — 99212 OFFICE O/P EST SF 10 MIN: CPT | Performed by: OBSTETRICS & GYNECOLOGY

## 2023-08-31 NOTE — PROGRESS NOTES
Assessment/Plan:    Problem List Items Addressed This Visit        Other    High grade squamous intraepithelial lesion (HGSIL), grade 3 KEYONA, on biopsy of cervix - Primary     59year-old status post hysterectomy for KEYONA-3. Follow-up Pap after hysterectomy 8/25/2022 was ASCUS, HPV negative. Repeat Pap smear performed today. Performance status is 0.  1.  Follow-up results of Pap smear and plan treatment accordingly. Relevant Orders    Liquid-based pap, diagnostic         CHIEF COMPLAINT: Here for follow-up Pap smear          Patient ID: Melissa Schroeder is a 59 y.o. female  Who returns for follow-up Pap smear. She has no complaints. The following portions of the patient's history were reviewed and updated as appropriate: allergies, current medications, past family history, past medical history, past social history, past surgical history and problem list.    Review of Systems   Constitutional: Negative for activity change and unexpected weight change. HENT: Negative. Eyes: Negative. Respiratory: Negative. Cardiovascular: Negative. Gastrointestinal: Negative for abdominal distention and abdominal pain. Endocrine: Negative. Genitourinary: Negative for pelvic pain and vaginal bleeding. Musculoskeletal: Negative. Skin: Negative. Allergic/Immunologic: Negative. Neurological: Negative. Hematological: Negative. Psychiatric/Behavioral: Negative.         Current Outpatient Medications   Medication Sig Dispense Refill   • Ascorbic Acid (VITAMIN C ADULT GUMMIES PO) Take by mouth 3 daily     • Cholecalciferol (VITAMIN D3) 2000 units capsule Take 1 capsule (2,000 Units total) by mouth daily (Patient taking differently: Take by mouth daily 2 daily) 30 capsule 5   • COLLAGEN PO Take by mouth 1 scoop daily     • loratadine (CLARITIN) 10 mg tablet Take 10 mg by mouth daily as needed     • pantoprazole (PROTONIX) 40 mg tablet Take 1 tablet (40 mg total) by mouth 2 (two) times a day 90 tablet 3   • ZINC OXIDE PO Take by mouth 3 daily  (Power zinc)       No current facility-administered medications for this visit. Objective:    Blood pressure 116/88, pulse 75, temperature (!) 94.8 °F (34.9 °C), temperature source Temporal, resp. rate 16, height 5' 7" (1.702 m), weight 72 kg (158 lb 12.8 oz), SpO2 96 %. Body mass index is 24.87 kg/m². Body surface area is 1.83 meters squared. Physical Exam  Vitals reviewed. Constitutional:       General: She is not in acute distress. Appearance: Normal appearance. She is not ill-appearing. HENT:      Head: Normocephalic and atraumatic. Mouth/Throat:      Mouth: Mucous membranes are moist.   Eyes:      General: No scleral icterus. Right eye: No discharge. Left eye: No discharge. Conjunctiva/sclera: Conjunctivae normal.   Pulmonary:      Effort: Pulmonary effort is normal.   Genitourinary:     Comments: External female genitalia normal.  No evidence of mass, lesion, bleeding. Spec examination reveals an atrophic vagina. No evidence of mass or lesions. Pap smear obtained. Musculoskeletal:      Right lower leg: No edema. Left lower leg: No edema. Skin:     General: Skin is warm and dry. Coloration: Skin is not jaundiced. Findings: No rash. Neurological:      General: No focal deficit present. Mental Status: She is alert and oriented to person, place, and time. Cranial Nerves: No cranial nerve deficit. Motor: No weakness. Gait: Gait normal.   Psychiatric:         Mood and Affect: Mood normal.         Behavior: Behavior normal.         Thought Content:  Thought content normal.         Judgment: Judgment normal.         No results found for: ""  Lab Results   Component Value Date     09/14/2016    K 4.1 06/20/2023     06/20/2023    CO2 26 06/20/2023    BUN 9 06/20/2023    CREATININE 0.84 06/20/2023    GLUCOSE 91 09/14/2016    GLUF 103 (H) 08/18/2021    CALCIUM 8.8 08/18/2021    AST 23 06/20/2023    ALT 19 06/20/2023    ALKPHOS 103 09/14/2016    PROT 6.5 09/14/2016    BILITOT 0.4 09/14/2016    EGFR 79 06/20/2023     Lab Results   Component Value Date    WBC 5.1 06/20/2023    HGB 14.9 06/20/2023    HCT 43.0 06/20/2023    MCV 88 06/20/2023     06/20/2023     Lab Results   Component Value Date    NEUTROABS 2.8 06/20/2023        Trend:  No results found for: ""

## 2023-08-31 NOTE — ASSESSMENT & PLAN NOTE
59year-old status post hysterectomy for KEYONA-3. Follow-up Pap after hysterectomy 8/25/2022 was ASCUS, HPV negative. Repeat Pap smear performed today. Performance status is 0.  1.  Follow-up results of Pap smear and plan treatment accordingly.

## 2023-09-08 LAB
LAB AP GYN PRIMARY INTERPRETATION: NORMAL
Lab: NORMAL

## 2023-09-18 ENCOUNTER — TELEPHONE (OUTPATIENT)
Dept: GYNECOLOGIC ONCOLOGY | Facility: CLINIC | Age: 63
End: 2023-09-18

## 2023-09-18 ENCOUNTER — TELEPHONE (OUTPATIENT)
Dept: HEMATOLOGY ONCOLOGY | Facility: CLINIC | Age: 63
End: 2023-09-18

## 2023-09-18 NOTE — TELEPHONE ENCOUNTER
Left message for patient to call the office to schedule a 1 year follow up appointment with a pap smear in [address] [place] [address] with Dr. Fiorella Mendez.

## 2023-09-18 NOTE — TELEPHONE ENCOUNTER
Appointment Schedule   Who are you speaking with? Patient   If it is not the patient, are they listed on an active communication consent form? N/A   Which provider is the appointment scheduled with? Dr. Teresa Olievira   At which location is the appointment scheduled for? [place]   When is the appointment scheduled? Please list date and time 9/19/24 1:45PM   What is the reason for this appointment? One year follow up   Did patient voice understanding of the details of this appointment? Yes   Was the no show policy reviewed with patient?  Yes

## 2023-11-26 DIAGNOSIS — K21.9 GASTROESOPHAGEAL REFLUX DISEASE WITHOUT ESOPHAGITIS: ICD-10-CM

## 2023-11-26 RX ORDER — PANTOPRAZOLE SODIUM 40 MG/1
40 TABLET, DELAYED RELEASE ORAL DAILY
Qty: 90 TABLET | Refills: 3 | Status: SHIPPED | OUTPATIENT
Start: 2023-11-26

## 2024-04-23 ENCOUNTER — HOSPITAL ENCOUNTER (OUTPATIENT)
Dept: RADIOLOGY | Facility: HOSPITAL | Age: 64
Discharge: HOME | End: 2024-04-23
Payer: COMMERCIAL

## 2024-04-23 ENCOUNTER — OFFICE VISIT (OUTPATIENT)
Dept: FAMILY MEDICINE CLINIC | Facility: HOSPITAL | Age: 64
End: 2024-04-23
Payer: COMMERCIAL

## 2024-04-23 ENCOUNTER — APPOINTMENT (OUTPATIENT)
Dept: LAB | Facility: HOSPITAL | Age: 64
End: 2024-04-23
Payer: COMMERCIAL

## 2024-04-23 VITALS
SYSTOLIC BLOOD PRESSURE: 130 MMHG | OXYGEN SATURATION: 96 % | DIASTOLIC BLOOD PRESSURE: 84 MMHG | HEIGHT: 67 IN | RESPIRATION RATE: 16 BRPM | HEART RATE: 80 BPM | BODY MASS INDEX: 24.8 KG/M2 | TEMPERATURE: 98 F | WEIGHT: 158 LBS

## 2024-04-23 DIAGNOSIS — E78.2 FLOATING BETA DISEASE: ICD-10-CM

## 2024-04-23 DIAGNOSIS — G89.29 CHRONIC PAIN OF LEFT THUMB: ICD-10-CM

## 2024-04-23 DIAGNOSIS — M25.522 LEFT ELBOW PAIN: Primary | ICD-10-CM

## 2024-04-23 DIAGNOSIS — E78.2 MIXED HYPERLIPIDEMIA: ICD-10-CM

## 2024-04-23 DIAGNOSIS — M79.645 CHRONIC PAIN OF LEFT THUMB: ICD-10-CM

## 2024-04-23 DIAGNOSIS — E78.2 MIXED HYPERLIPIDEMIA: Primary | ICD-10-CM

## 2024-04-23 DIAGNOSIS — R21 RASH: ICD-10-CM

## 2024-04-23 DIAGNOSIS — K21.9 GASTROESOPHAGEAL REFLUX DISEASE WITHOUT ESOPHAGITIS: ICD-10-CM

## 2024-04-23 PROBLEM — R10.31 RIGHT LOWER QUADRANT ABDOMINAL PAIN: Status: RESOLVED | Noted: 2023-06-15 | Resolved: 2024-04-23

## 2024-04-23 LAB
ALBUMIN SERPL BCP-MCNC: 4.7 G/DL (ref 3.5–5)
ALP SERPL-CCNC: 57 U/L (ref 34–104)
ALT SERPL W P-5'-P-CCNC: 20 U/L (ref 7–52)
ANION GAP SERPL CALCULATED.3IONS-SCNC: 7 MMOL/L (ref 4–13)
AST SERPL W P-5'-P-CCNC: 18 U/L (ref 13–39)
BACTERIA UR QL AUTO: NORMAL /HPF
BILIRUB SERPL-MCNC: 0.45 MG/DL (ref 0.2–1)
BILIRUB UR QL STRIP: NEGATIVE
BUN SERPL-MCNC: 13 MG/DL (ref 5–25)
CALCIUM SERPL-MCNC: 9.6 MG/DL (ref 8.4–10.2)
CHLORIDE SERPL-SCNC: 103 MMOL/L (ref 96–108)
CHOLEST SERPL-MCNC: 281 MG/DL
CLARITY UR: CLEAR
CO2 SERPL-SCNC: 30 MMOL/L (ref 21–32)
COLOR UR: YELLOW
CREAT SERPL-MCNC: 0.86 MG/DL (ref 0.6–1.3)
ERYTHROCYTE [DISTWIDTH] IN BLOOD BY AUTOMATED COUNT: 12 % (ref 11.6–15.1)
GFR SERPL CREATININE-BSD FRML MDRD: 72 ML/MIN/1.73SQ M
GLUCOSE P FAST SERPL-MCNC: 95 MG/DL (ref 65–99)
GLUCOSE UR STRIP-MCNC: NEGATIVE MG/DL
HCT VFR BLD AUTO: 45.4 % (ref 34.8–46.1)
HDLC SERPL-MCNC: 78 MG/DL
HGB BLD-MCNC: 14.9 G/DL (ref 11.5–15.4)
HGB UR QL STRIP.AUTO: NEGATIVE
KETONES UR STRIP-MCNC: NEGATIVE MG/DL
LDLC SERPL CALC-MCNC: 155 MG/DL (ref 0–100)
LEUKOCYTE ESTERASE UR QL STRIP: ABNORMAL
MCH RBC QN AUTO: 30 PG (ref 26.8–34.3)
MCHC RBC AUTO-ENTMCNC: 32.8 G/DL (ref 31.4–37.4)
MCV RBC AUTO: 91 FL (ref 82–98)
NITRITE UR QL STRIP: NEGATIVE
NON-SQ EPI CELLS URNS QL MICRO: NORMAL /HPF
NONHDLC SERPL-MCNC: 203 MG/DL
PH UR STRIP.AUTO: 5 [PH]
PLATELET # BLD AUTO: 248 THOUSANDS/UL (ref 149–390)
PMV BLD AUTO: 9.4 FL (ref 8.9–12.7)
POTASSIUM SERPL-SCNC: 4.1 MMOL/L (ref 3.5–5.3)
PROT SERPL-MCNC: 7.2 G/DL (ref 6.4–8.4)
PROT UR STRIP-MCNC: NEGATIVE MG/DL
RBC # BLD AUTO: 4.97 MILLION/UL (ref 3.81–5.12)
RBC #/AREA URNS AUTO: NORMAL /HPF
SODIUM SERPL-SCNC: 140 MMOL/L (ref 135–147)
SP GR UR STRIP.AUTO: 1.02 (ref 1–1.03)
TRIGL SERPL-MCNC: 241 MG/DL
TSH SERPL DL<=0.05 MIU/L-ACNC: 1.49 UIU/ML (ref 0.45–4.5)
UROBILINOGEN UR STRIP-ACNC: <2 MG/DL
WBC # BLD AUTO: 6.62 THOUSAND/UL (ref 4.31–10.16)
WBC #/AREA URNS AUTO: NORMAL /HPF

## 2024-04-23 PROCEDURE — 36415 COLL VENOUS BLD VENIPUNCTURE: CPT

## 2024-04-23 PROCEDURE — 73140 X-RAY EXAM OF FINGER(S): CPT

## 2024-04-23 PROCEDURE — 81001 URINALYSIS AUTO W/SCOPE: CPT

## 2024-04-23 PROCEDURE — 85027 COMPLETE CBC AUTOMATED: CPT

## 2024-04-23 PROCEDURE — 84443 ASSAY THYROID STIM HORMONE: CPT

## 2024-04-23 PROCEDURE — 80061 LIPID PANEL: CPT

## 2024-04-23 PROCEDURE — 99214 OFFICE O/P EST MOD 30 MIN: CPT | Performed by: INTERNAL MEDICINE

## 2024-04-23 PROCEDURE — 80053 COMPREHEN METABOLIC PANEL: CPT

## 2024-04-23 RX ORDER — PANTOPRAZOLE SODIUM 40 MG/1
40 TABLET, DELAYED RELEASE ORAL 2 TIMES DAILY
Qty: 60 TABLET | Refills: 5 | Status: SHIPPED | OUTPATIENT
Start: 2024-04-23

## 2024-04-23 NOTE — PROGRESS NOTES
Assessment/Plan:    Gastroesophageal reflux disease without esophagitis  Worse after eating pizza, pasta, decaf coffee makes gerd worse  Pt has to take pantoprazole 40mg bid  It's worse when she lays on her left side at night.  Has dysphagia swallowing sometimes when swallowing pills.  Denies signs of gi bleeding  Abdomen is normal  Will check hgb  Refer to GI for EGD: I am concerned about dysphagia, longstanding symptoms, wonder if she Garcia's esophagus, if she develops stricture  Gave pt info on diet    Chronic pain of left thumb  Patient developed left thumb pain located to the left MCP joint last Sunday when she pulled herself up doing abdominal exercises that she heard a crunch when that pain started.  She has swelling and tenderness of the left first MCP joint.  I ordered x-ray to look for fracture  In the meantime she will continue icing the joint    Left elbow pain  She also complains of left elbow pain going on intermittently for months.  It is worse when using the left elbow.  It radiates from the left lateral elbow towards the wrist.  She does have tenderness of the left lateral epicondyle.  Suspect she has a lateral epicondylitis.  I asked her to buy a tennis elbow brace, ice the left elbow.  If not better will refer patient to physical therapy    Mixed hyperlipidemia  Patient is physically very active, she exercises regularly.  Her father had hyperlipidemia.  Her cholesterol was very high last year.  I am repeating her cholesterol     Diagnoses and all orders for this visit:    Left elbow pain    Gastroesophageal reflux disease without esophagitis  -     CBC and Platelet; Future  -     Ambulatory referral to Gastroenterology; Future  -     pantoprazole (PROTONIX) 40 mg tablet; Take 1 tablet (40 mg total) by mouth 2 (two) times a day  -     CBC and Platelet    Rash  -     Ambulatory referral to Dermatology; Future    Chronic pain of left thumb  -     XR thumb left first digit-min 2v; Future    Mixed  "hyperlipidemia  -     Comprehensive metabolic panel; Future  -     Lipid panel; Future  -     TSH, 3rd generation with Free T4 reflex; Future  -     TSH, 3rd generation with Free T4 reflex          Subjective:      Patient ID: Kayleigh Carrasquillo is a 63 y.o. female.      HPI    Patient presents for follow-up of chronic conditions as detailed in the assessment and plan.      The following portions of the patient's history were reviewed and updated as appropriate: current medications, past family history, past medical history, past social history, past surgical history and problem list.    Review of Systems      Objective:    /84   Pulse 80   Temp 98 °F (36.7 °C) (Tympanic)   Resp 16   Ht 5' 7\" (1.702 m)   Wt 71.7 kg (158 lb)   LMP  (LMP Unknown)   SpO2 96%   BMI 24.75 kg/m²      Physical Exam  Constitutional:       General: She is not in acute distress.     Appearance: She is not toxic-appearing.   Cardiovascular:      Rate and Rhythm: Normal rate and regular rhythm.      Heart sounds: No murmur heard.  Pulmonary:      Effort: No respiratory distress.      Breath sounds: No wheezing or rales.   Abdominal:      General: Bowel sounds are normal.      Palpations: Abdomen is soft.      Tenderness: There is no guarding.   Musculoskeletal:         General: Swelling and tenderness present.   Neurological:      Mental Status: She is alert.             Depression Screening and Follow-up Plan: Patient was screened for depression during today's encounter. They screened negative with a PHQ-2 score of 0.      Chery Tracy MD  "

## 2024-04-23 NOTE — ASSESSMENT & PLAN NOTE
She also complains of left elbow pain going on intermittently for months.  It is worse when using the left elbow.  It radiates from the left lateral elbow towards the wrist.  She does have tenderness of the left lateral epicondyle.  Suspect she has a lateral epicondylitis.  I asked her to buy a tennis elbow brace, ice the left elbow.  If not better will refer patient to physical therapy

## 2024-04-23 NOTE — ASSESSMENT & PLAN NOTE
Patient developed left thumb pain located to the left MCP joint last Sunday when she pulled herself up doing abdominal exercises that she heard a crunch when that pain started.  She has swelling and tenderness of the left first MCP joint.  I ordered x-ray to look for fracture  In the meantime she will continue icing the joint

## 2024-04-23 NOTE — ASSESSMENT & PLAN NOTE
Worse after eating pizza, pasta, decaf coffee makes gerd worse  Pt has to take pantoprazole 40mg bid  It's worse when she lays on her left side at night.  Has dysphagia swallowing sometimes when swallowing pills.  Denies signs of gi bleeding  Abdomen is normal  Will check hgb  Refer to GI for EGD: I am concerned about dysphagia, longstanding symptoms, wonder if she Garcia's esophagus, if she develops stricture  Gave pt info on diet

## 2024-04-23 NOTE — ASSESSMENT & PLAN NOTE
Patient is physically very active, she exercises regularly.  Her father had hyperlipidemia.  Her cholesterol was very high last year.  I am repeating her cholesterol

## 2024-04-23 NOTE — PATIENT INSTRUCTIONS
Diet for Stomach Ulcers and Gastritis   WHAT YOU NEED TO KNOW:   A diet for stomach ulcers and gastritis is a meal plan that limits foods that irritate your stomach. Certain foods may worsen symptoms such as stomach pain, bloating, heartburn, or indigestion.  DISCHARGE INSTRUCTIONS:   Foods to limit or avoid:  You may need to avoid acidic, spicy, or high-fat foods. Not all foods affect everyone the same way. You will need to learn which foods worsen your symptoms and limit those foods. The following are some foods that may worsen ulcer or gastritis symptoms:  Beverages:      Whole milk and chocolate milk    Hot cocoa and cola    Any beverage with caffeine    Regular and decaffeinated coffee    Peppermint and spearmint tea    Green and black tea, with or without caffeine    Orange and grapefruit juices    Drinks that contain alcohol    Spices and seasonings:      Black and red pepper    Chili powder    Mustard seed and nutmeg    Other foods:      Dairy foods made from whole milk or cream    Chocolate    Spicy or strongly flavored cheeses, such as jalapeno or black pepper    Highly seasoned, high-fat meats, such as sausage, salami, spears, ham, and cold cuts    Hot chiles and peppers    Tomato products, such as tomato paste, tomato sauce, or tomato juice    Foods to include:  Eat a variety of healthy foods from all the food groups. Eat fruits, vegetables, whole grains, and fat-free or low-fat dairy foods. Whole grains include whole-wheat breads, cereals, pasta, and brown rice. Choose lean meats, poultry (chicken and turkey), fish, beans, eggs, and nuts. A healthy meal plan is low in unhealthy fats, salt, and added sugar. Healthy fats include olive oil and canola oil. Ask your dietitian for more information about a healthy diet.       Other helpful guidelines:   Do not eat right before bedtime.  Stop eating at least 2 hours before bedtime.    Eat small, frequent meals.  Your stomach may tolerate small, frequent meals  better than large meals.    © Copyright [employer] 2023 Information is for End User's use only and may not be sold, redistributed or otherwise used for commercial purposes.  The above information is an  only. It is not intended as medical advice for individual conditions or treatments. Talk to your doctor, nurse or pharmacist before following any medical regimen to see if it is safe and effective for you.

## 2024-05-08 ENCOUNTER — TELEPHONE (OUTPATIENT)
Dept: GASTROENTEROLOGY | Facility: CLINIC | Age: 64
End: 2024-05-08

## 2024-05-08 ENCOUNTER — CONSULT (OUTPATIENT)
Dept: GASTROENTEROLOGY | Facility: CLINIC | Age: 64
End: 2024-05-08
Payer: COMMERCIAL

## 2024-05-08 VITALS
DIASTOLIC BLOOD PRESSURE: 80 MMHG | BODY MASS INDEX: 25.11 KG/M2 | SYSTOLIC BLOOD PRESSURE: 120 MMHG | HEIGHT: 67 IN | WEIGHT: 160 LBS

## 2024-05-08 DIAGNOSIS — K59.00 CONSTIPATION, UNSPECIFIED CONSTIPATION TYPE: ICD-10-CM

## 2024-05-08 DIAGNOSIS — Z12.11 SCREENING FOR COLORECTAL CANCER: ICD-10-CM

## 2024-05-08 DIAGNOSIS — K21.9 GASTROESOPHAGEAL REFLUX DISEASE WITHOUT ESOPHAGITIS: Primary | ICD-10-CM

## 2024-05-08 DIAGNOSIS — Z12.12 SCREENING FOR COLORECTAL CANCER: ICD-10-CM

## 2024-05-08 PROCEDURE — 99243 OFF/OP CNSLTJ NEW/EST LOW 30: CPT | Performed by: INTERNAL MEDICINE

## 2024-05-08 NOTE — TELEPHONE ENCOUNTER
Scheduled date of EGD(as of today):06/19/2024  Physician performing EGD:Allen  Location of EGD:[facility]  Instructions reviewed with patient by:ANTHONY  Clearances: NONE
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
## 2024-06-12 ENCOUNTER — HOSPITAL ENCOUNTER (OUTPATIENT)
Dept: MAMMOGRAPHY | Facility: CLINIC | Age: 64
Discharge: HOME | End: 2024-06-12
Payer: COMMERCIAL

## 2024-06-12 VITALS — WEIGHT: 160 LBS | HEIGHT: 67 IN | BODY MASS INDEX: 25.11 KG/M2

## 2024-06-12 DIAGNOSIS — Z12.31 VISIT FOR SCREENING MAMMOGRAM: ICD-10-CM

## 2024-06-12 PROCEDURE — 77067 SCR MAMMO BI INCL CAD: CPT

## 2024-06-12 PROCEDURE — 77063 BREAST TOMOSYNTHESIS BI: CPT

## 2024-07-01 ENCOUNTER — ANESTHESIA (OUTPATIENT)
Dept: ANESTHESIOLOGY | Facility: AMBULATORY SURGERY CENTER | Age: 64
End: 2024-07-01

## 2024-07-01 ENCOUNTER — ANESTHESIA EVENT (OUTPATIENT)
Dept: ANESTHESIOLOGY | Facility: AMBULATORY SURGERY CENTER | Age: 64
End: 2024-07-01

## 2024-07-15 ENCOUNTER — HOSPITAL ENCOUNTER (OUTPATIENT)
Dept: GASTROENTEROLOGY | Facility: AMBULATORY SURGERY CENTER | Age: 64
Discharge: HOME | End: 2024-07-15
Attending: INTERNAL MEDICINE
Payer: COMMERCIAL

## 2024-07-15 ENCOUNTER — ANESTHESIA (OUTPATIENT)
Dept: GASTROENTEROLOGY | Facility: AMBULATORY SURGERY CENTER | Age: 64
End: 2024-07-15

## 2024-07-15 ENCOUNTER — ANESTHESIA EVENT (OUTPATIENT)
Dept: GASTROENTEROLOGY | Facility: AMBULATORY SURGERY CENTER | Age: 64
End: 2024-07-15

## 2024-07-15 VITALS
DIASTOLIC BLOOD PRESSURE: 73 MMHG | WEIGHT: 160 LBS | OXYGEN SATURATION: 97 % | HEIGHT: 67 IN | HEART RATE: 67 BPM | RESPIRATION RATE: 20 BRPM | BODY MASS INDEX: 25.11 KG/M2 | TEMPERATURE: 97.5 F | SYSTOLIC BLOOD PRESSURE: 113 MMHG

## 2024-07-15 DIAGNOSIS — K21.9 GASTROESOPHAGEAL REFLUX DISEASE WITHOUT ESOPHAGITIS: ICD-10-CM

## 2024-07-15 PROCEDURE — 43239 EGD BIOPSY SINGLE/MULTIPLE: CPT | Performed by: INTERNAL MEDICINE

## 2024-07-15 PROCEDURE — 88305 TISSUE EXAM BY PATHOLOGIST: CPT | Performed by: STUDENT IN AN ORGANIZED HEALTH CARE EDUCATION/TRAINING PROGRAM

## 2024-07-15 RX ORDER — ESOMEPRAZOLE MAGNESIUM 40 MG/1
40 CAPSULE, DELAYED RELEASE ORAL DAILY
Qty: 30 CAPSULE | Refills: 5 | Status: SHIPPED | OUTPATIENT
Start: 2024-07-15 | End: 2025-01-11

## 2024-07-15 RX ORDER — SODIUM CHLORIDE, SODIUM LACTATE, POTASSIUM CHLORIDE, CALCIUM CHLORIDE 600; 310; 30; 20 MG/100ML; MG/100ML; MG/100ML; MG/100ML
50 INJECTION, SOLUTION INTRAVENOUS CONTINUOUS
Status: DISCONTINUED | OUTPATIENT
Start: 2024-07-15 | End: 2024-07-19 | Stop reason: HOSPADM

## 2024-07-15 RX ORDER — PROPOFOL 10 MG/ML
INJECTION, EMULSION INTRAVENOUS AS NEEDED
Status: DISCONTINUED | OUTPATIENT
Start: 2024-07-15 | End: 2024-07-15

## 2024-07-15 RX ADMIN — SODIUM CHLORIDE, SODIUM LACTATE, POTASSIUM CHLORIDE, CALCIUM CHLORIDE: 600; 310; 30; 20 INJECTION, SOLUTION INTRAVENOUS at 09:34

## 2024-07-15 RX ADMIN — SODIUM CHLORIDE, SODIUM LACTATE, POTASSIUM CHLORIDE, CALCIUM CHLORIDE 50 ML/HR: 600; 310; 30; 20 INJECTION, SOLUTION INTRAVENOUS at 09:17

## 2024-07-15 RX ADMIN — PROPOFOL 50 MG: 10 INJECTION, EMULSION INTRAVENOUS at 09:30

## 2024-07-15 RX ADMIN — PROPOFOL 100 MG: 10 INJECTION, EMULSION INTRAVENOUS at 09:29

## 2024-07-15 RX ADMIN — PROPOFOL 50 MG: 10 INJECTION, EMULSION INTRAVENOUS at 09:32

## 2024-07-15 NOTE — ANESTHESIA PREPROCEDURE EVALUATION
Procedure:  EGD    Relevant Problems   CARDIO   (+) Mixed hyperlipidemia      GI/HEPATIC   (+) Gastroesophageal reflux disease without esophagitis      NEURO/PSYCH   (+) Chronic pain of left thumb      PULMONARY   (+) NAEL (obstructive sleep apnea)        Physical Exam    Airway    Mallampati score: II  TM Distance: >3 FB  Neck ROM: full     Dental   No notable dental hx     Cardiovascular      Pulmonary      Other Findings  post-pubertal.      Anesthesia Plan  ASA Score- 3     Anesthesia Type- IV sedation with anesthesia with ASA Monitors.         Additional Monitors:     Airway Plan:            Plan Factors-Exercise tolerance (METS): >4 METS.    Chart reviewed.    Patient summary reviewed.    Patient is not a current smoker.              Induction- intravenous.    Postoperative Plan-         Informed Consent- Anesthetic plan and risks discussed with patient.  I personally reviewed this patient with the CRNA. Discussed and agreed on the Anesthesia Plan with the CRNA..

## 2024-07-15 NOTE — ANESTHESIA POSTPROCEDURE EVALUATION
Post-Op Assessment Note    CV Status:  Stable  Pain Score: 0    Pain management: adequate       Mental Status:  Alert and awake   Hydration Status:  Euvolemic   PONV Controlled:  Controlled   Airway Patency:  Patent     Post Op Vitals Reviewed: Yes    No anethesia notable event occurred.    Staff: CRNA               BP   163/79   Temp   98   Pulse  69   Resp   16   SpO2   99
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
## 2024-07-17 PROCEDURE — 88305 TISSUE EXAM BY PATHOLOGIST: CPT | Performed by: STUDENT IN AN ORGANIZED HEALTH CARE EDUCATION/TRAINING PROGRAM

## 2024-08-06 DIAGNOSIS — K21.9 GASTROESOPHAGEAL REFLUX DISEASE WITHOUT ESOPHAGITIS: ICD-10-CM

## 2024-08-06 RX ORDER — ESOMEPRAZOLE MAGNESIUM 40 MG/1
40 CAPSULE, DELAYED RELEASE ORAL DAILY
Qty: 90 CAPSULE | Refills: 1 | Status: SHIPPED | OUTPATIENT
Start: 2024-08-06

## 2024-09-03 ENCOUNTER — OFFICE VISIT (OUTPATIENT)
Dept: FAMILY MEDICINE CLINIC | Facility: HOSPITAL | Age: 64
End: 2024-09-03
Payer: COMMERCIAL

## 2024-09-03 VITALS
HEART RATE: 72 BPM | OXYGEN SATURATION: 96 % | WEIGHT: 159 LBS | SYSTOLIC BLOOD PRESSURE: 120 MMHG | HEIGHT: 67 IN | TEMPERATURE: 97.6 F | BODY MASS INDEX: 24.96 KG/M2 | RESPIRATION RATE: 16 BRPM | DIASTOLIC BLOOD PRESSURE: 82 MMHG

## 2024-09-03 DIAGNOSIS — G89.29 CHRONIC PAIN OF LEFT THUMB: ICD-10-CM

## 2024-09-03 DIAGNOSIS — J01.00 ACUTE MAXILLARY SINUSITIS, RECURRENCE NOT SPECIFIED: Primary | ICD-10-CM

## 2024-09-03 DIAGNOSIS — M79.645 CHRONIC PAIN OF LEFT THUMB: ICD-10-CM

## 2024-09-03 PROBLEM — M25.522 LEFT ELBOW PAIN: Status: RESOLVED | Noted: 2024-04-23 | Resolved: 2024-09-03

## 2024-09-03 PROBLEM — R21 RASH: Status: RESOLVED | Noted: 2024-04-23 | Resolved: 2024-09-03

## 2024-09-03 PROCEDURE — 99213 OFFICE O/P EST LOW 20 MIN: CPT | Performed by: INTERNAL MEDICINE

## 2024-09-03 RX ORDER — AMOXICILLIN 400 MG/5ML
400 POWDER, FOR SUSPENSION ORAL 3 TIMES DAILY
Qty: 105 ML | Refills: 0 | Status: SHIPPED | OUTPATIENT
Start: 2024-09-03 | End: 2024-09-10

## 2024-09-03 RX ORDER — FLUTICASONE PROPIONATE 50 MCG
2 SPRAY, SUSPENSION (ML) NASAL DAILY
Qty: 16 G | Refills: 0 | Status: SHIPPED | OUTPATIENT
Start: 2024-09-03 | End: 2024-09-17

## 2024-09-03 NOTE — ASSESSMENT & PLAN NOTE
Patient presents with a chief complaint of feeling pressure, swelling on the right side of the neck and face.  She feels tired and fatigued without fever.  There is an annoying type of feeling behind the right ear.  She has nasal blockage in the right side and postnasal dripping.  The hearing in the right ear is not as clear as before and has a blocked type of feeling    When she gets out of bed or when she walks down steps she has a feeling that she moves forward and she has to catch herself    She denies pain of the right cheekbone, drooping or loss of sensation to touch in the rest of the face.    She has nasal septal deviation to the left.  She has retraction of the right tympanic membrane but I saw no erythema or fluid level.  I saw no evidence of pharyngeal infection. I do not feel lymphadenopathy of the right side of her neck    She had no nystagmus, she had no pronator drift.  Strength was normal equal.  Finger-nose-finger and Romberg were negative.  She had difficulty walking heel-to-toe but  her gait was normal otherwise.    She could have a sinus infection that I do not see very well.  She could have eustachian tube dysfunction causing her symptoms.  I doubt that brain tightness, CVA.  She is not hypotensive.    Will try amoxicillin for possible sinus infection and Flonase nasal spray for eustachian tube dysfunction.  I asked patient to call if anything new develop or if her symptoms get worse.

## 2024-09-03 NOTE — PROGRESS NOTES
Ambulatory Visit  Name: Kayleigh Carrasquillo      : 1960      MRN: [id number]  Encounter Provider: Chery Tracy MD  Encounter Date: 9/3/2024   Encounter department: [facility] CARE [address]    Assessment & Plan   1. Acute maxillary sinusitis, recurrence not specified  Assessment & Plan:  Patient presents with a chief complaint of feeling pressure, swelling on the right side of the neck and face.  She feels tired and fatigued without fever.  There is an annoying type of feeling behind the right ear.  She has nasal blockage in the right side and postnasal dripping.  The hearing in the right ear is not as clear as before and has a blocked type of feeling    When she gets out of bed or when she walks down steps she has a feeling that she moves forward and she has to catch herself    She denies pain of the right cheekbone, drooping or loss of sensation to touch in the rest of the face.    She has nasal septal deviation to the left.  She has retraction of the right tympanic membrane but I saw no erythema or fluid level.  I saw no evidence of pharyngeal infection. I do not feel lymphadenopathy of the right side of her neck    She had no nystagmus, she had no pronator drift.  Strength was normal equal.  Finger-nose-finger and Romberg were negative.  She had difficulty walking heel-to-toe but  her gait was normal otherwise.    She could have a sinus infection that I do not see very well.  She could have eustachian tube dysfunction causing her symptoms.  I doubt that brain tightness, CVA.  She is not hypotensive.    Will try amoxicillin for possible sinus infection and Flonase nasal spray for eustachian tube dysfunction.  I asked patient to call if anything new develop or if her symptoms get worse.    Orders:  -     amoxicillin (AMOXIL) 400 MG/5ML suspension; Take 5 mL (400 mg total) by mouth 3 (three) times a day for 7 days  -     fluticasone (FLONASE) 50 mcg/act nasal spray; 2 sprays into each  "nostril daily for 14 days  2. Chronic pain of left thumb  Assessment & Plan:  She injured her left thumb in April when she was exercising.  X-ray showed no fracture.  She has persistent pain when she is flexing her left thumb despite wearing wrist splints.  I referred patient to hand surgery  Orders:  -     Ambulatory Referral to Orthopedic Surgery; Future       History of Present Illness     HPI    Review of Systems    Objective     /82   Pulse 72   Temp 97.6 °F (36.4 °C) (Tympanic)   Resp 16   Ht 5' 7\" (1.702 m)   Wt 72.1 kg (159 lb)   LMP  (LMP Unknown)   SpO2 96%   BMI 24.90 kg/m²     Physical Exam  Constitutional:       General: She is not in acute distress.     Appearance: She is not toxic-appearing.   HENT:      Head: Normocephalic.      Right Ear: Tympanic membrane normal. There is no impacted cerumen.      Left Ear: Tympanic membrane normal. There is no impacted cerumen.      Nose: Rhinorrhea present.      Mouth/Throat:      Mouth: Mucous membranes are moist.      Pharynx: No oropharyngeal exudate or posterior oropharyngeal erythema.   Eyes:      Extraocular Movements: Extraocular movements intact.      Conjunctiva/sclera: Conjunctivae normal.   Cardiovascular:      Rate and Rhythm: Normal rate and regular rhythm.   Pulmonary:      Effort: Pulmonary effort is normal.      Breath sounds: Normal breath sounds.   Musculoskeletal:      Cervical back: Neck supple.   Lymphadenopathy:      Cervical: No cervical adenopathy.   Neurological:      Mental Status: She is alert and oriented to person, place, and time.      Cranial Nerves: No cranial nerve deficit.      Motor: No weakness.      Coordination: Coordination normal.      Deep Tendon Reflexes: Reflexes normal.       Administrative Statements           "

## 2024-09-03 NOTE — ASSESSMENT & PLAN NOTE
She injured her left thumb in April when she was exercising.  X-ray showed no fracture.  She has persistent pain when she is flexing her left thumb despite wearing wrist splints.  I referred patient to hand surgery

## 2024-09-12 ENCOUNTER — OFFICE VISIT (OUTPATIENT)
Age: 64
End: 2024-09-12
Payer: COMMERCIAL

## 2024-09-12 VITALS
BODY MASS INDEX: 25.36 KG/M2 | OXYGEN SATURATION: 96 % | RESPIRATION RATE: 16 BRPM | SYSTOLIC BLOOD PRESSURE: 120 MMHG | HEART RATE: 79 BPM | DIASTOLIC BLOOD PRESSURE: 80 MMHG | TEMPERATURE: 98 F | WEIGHT: 161.6 LBS | HEIGHT: 67 IN

## 2024-09-12 DIAGNOSIS — D06.9 HIGH GRADE SQUAMOUS INTRAEPITHELIAL LESION (HGSIL), GRADE 3 CIN, ON BIOPSY OF CERVIX: Primary | ICD-10-CM

## 2024-09-12 PROCEDURE — 88175 CYTOPATH C/V AUTO FLUID REDO: CPT | Performed by: PHYSICIAN ASSISTANT

## 2024-09-12 PROCEDURE — 87624 HPV HI-RISK TYP POOLED RSLT: CPT | Performed by: PHYSICIAN ASSISTANT

## 2024-09-12 PROCEDURE — 99212 OFFICE O/P EST SF 10 MIN: CPT | Performed by: PHYSICIAN ASSISTANT

## 2024-09-12 NOTE — PROGRESS NOTES
Assessment/Plan:    Problem List Items Addressed This Visit          Other    High grade squamous intraepithelial lesion (HGSIL), grade 3 KEYONA, on biopsy of cervix - Primary     64-year-old with CIN3 s/p robotic-assisted TLH, BSO in January 2022. Post-operative PAP smear in August 2022 was ASCUS, negative HPV and August 2023 was normal without HPV. Repeat PAP smear performed today.     I will follow-up with today's PAP smear results.     If normal, will plan to transition to PAP smears every 3 years with HPV.          Relevant Orders    Liquid-based pap, diagnostic         CHIEF COMPLAINT:   1 year follow-up PAP smear    Problem:  CIN3      Previous therapy:  1. LEEP, 8/18/21.     2. Robotic-assisted TLH, BSO, lysis of adhesions and cystoscopy on 1/4/22.  A. KEYONA 3, no malignancy      Patient ID: Kayleigh Carrasquillo is a 64 y.o. female  who presents to the office for 1 year f/u PAP smear. She is without gynecologic complaints. She denies vaginal bleeding, discharge or pelvic pain. Normal bowel/bladder function. She is working with her PCP due to new onset nasal pressure and dizziness. Her PAP smear performed in August 2022 was normal without HPV.         The following portions of the patient's history were reviewed and updated as appropriate: allergies, current medications, past medical history, past surgical history, and problem list.    Review of Systems   Constitutional: Negative.    HENT: Negative.     Eyes: Negative.    Respiratory: Negative.     Cardiovascular: Negative.    Gastrointestinal: Negative.    Genitourinary: Negative.    Musculoskeletal: Negative.    Skin: Negative.    Neurological: Negative.    Psychiatric/Behavioral: Negative.         Current Outpatient Medications   Medication Sig Dispense Refill    Ascorbic Acid (VITAMIN C ADULT GUMMIES PO) Take by mouth 2 daily ---has zinc/bromelain/elderberry/vit d      COLLAGEN PO Take by mouth 1 scoop daily      esomeprazole (NexIUM) 40 MG capsule TAKE 1 CAPSULE  "BY MOUTH DAILY 90 capsule 1    fluticasone (FLONASE) 50 mcg/act nasal spray 2 sprays into each nostril daily for 14 days 16 g 0    loratadine (CLARITIN) 10 mg tablet Take 10 mg by mouth daily as needed (Patient not taking: Reported on 9/12/2024)       No current facility-administered medications for this visit.           Objective:    Blood pressure 120/80, pulse 79, temperature 98 °F (36.7 °C), temperature source Temporal, resp. rate 16, height 5' 7\" (1.702 m), weight 73.3 kg (161 lb 9.6 oz), SpO2 96%.  Body mass index is 25.31 kg/m².  Body surface area is 1.85 meters squared.    Physical Exam  Vitals reviewed. Exam conducted with a chaperone present.   Constitutional:       General: She is not in acute distress.     Appearance: Normal appearance. She is not ill-appearing.   HENT:      Head: Normocephalic and atraumatic.      Mouth/Throat:      Mouth: Mucous membranes are moist.   Eyes:      General:         Right eye: No discharge.         Left eye: No discharge.      Conjunctiva/sclera: Conjunctivae normal.   Pulmonary:      Effort: Pulmonary effort is normal.   Abdominal:      Palpations: Abdomen is soft. There is no mass.      Tenderness: There is no abdominal tenderness.      Hernia: No hernia is present.   Genitourinary:     Comments: The external female genitalia is normal. The bartholin's, uretheral and skenes glands are normal. The urethral meatus is normal (midline with no lesions). Anus without fissure or lesion. Speculum exam reveals a grossly normal vagina. No masses, lesions,discharge or bleeding. PAP smear performed.  Musculoskeletal:      Right lower leg: No edema.      Left lower leg: No edema.   Skin:     General: Skin is warm and dry.      Coloration: Skin is not jaundiced.      Findings: No rash.   Neurological:      General: No focal deficit present.      Mental Status: She is alert and oriented to person, place, and time.      Cranial Nerves: No cranial nerve deficit.      Sensory: No sensory " deficit.      Motor: No weakness.      Gait: Gait normal.   Psychiatric:         Mood and Affect: Mood normal.         Behavior: Behavior normal.         Thought Content: Thought content normal.         Judgment: Judgment normal.

## 2024-09-12 NOTE — ASSESSMENT & PLAN NOTE
64-year-old with CIN3 s/p robotic-assisted TLH, BSO in January 2022. Post-operative PAP smear in August 2022 was ASCUS, negative HPV and August 2023 was normal without HPV. Repeat PAP smear performed today.     I will follow-up with today's PAP smear results.     If normal, will plan to transition to PAP smears every 3 years with HPV.

## 2024-09-17 LAB
LAB AP GYN PRIMARY INTERPRETATION: NORMAL
Lab: NORMAL

## 2024-09-30 ENCOUNTER — OFFICE VISIT (OUTPATIENT)
Dept: OBGYN CLINIC | Facility: CLINIC | Age: 64
End: 2024-09-30
Payer: COMMERCIAL

## 2024-09-30 VITALS
WEIGHT: 163 LBS | SYSTOLIC BLOOD PRESSURE: 111 MMHG | DIASTOLIC BLOOD PRESSURE: 60 MMHG | BODY MASS INDEX: 25.58 KG/M2 | HEIGHT: 67 IN

## 2024-09-30 DIAGNOSIS — M79.645 CHRONIC PAIN OF LEFT THUMB: ICD-10-CM

## 2024-09-30 DIAGNOSIS — M65.312 TRIGGER THUMB OF LEFT HAND: Primary | ICD-10-CM

## 2024-09-30 DIAGNOSIS — G89.29 CHRONIC PAIN OF LEFT THUMB: ICD-10-CM

## 2024-09-30 DIAGNOSIS — J01.00 ACUTE MAXILLARY SINUSITIS, RECURRENCE NOT SPECIFIED: ICD-10-CM

## 2024-09-30 PROCEDURE — 20550 NJX 1 TENDON SHEATH/LIGAMENT: CPT | Performed by: ORTHOPAEDIC SURGERY

## 2024-09-30 PROCEDURE — 99203 OFFICE O/P NEW LOW 30 MIN: CPT | Performed by: ORTHOPAEDIC SURGERY

## 2024-09-30 RX ORDER — LIDOCAINE HYDROCHLORIDE 10 MG/ML
1 INJECTION, SOLUTION INFILTRATION; PERINEURAL
Status: COMPLETED | OUTPATIENT
Start: 2024-09-30 | End: 2024-09-30

## 2024-09-30 RX ORDER — BETAMETHASONE SODIUM PHOSPHATE AND BETAMETHASONE ACETATE 3; 3 MG/ML; MG/ML
6 INJECTION, SUSPENSION INTRA-ARTICULAR; INTRALESIONAL; INTRAMUSCULAR; SOFT TISSUE
Status: COMPLETED | OUTPATIENT
Start: 2024-09-30 | End: 2024-09-30

## 2024-09-30 RX ADMIN — BETAMETHASONE SODIUM PHOSPHATE AND BETAMETHASONE ACETATE 6 MG: 3; 3 INJECTION, SUSPENSION INTRA-ARTICULAR; INTRALESIONAL; INTRAMUSCULAR; SOFT TISSUE at 11:00

## 2024-09-30 RX ADMIN — LIDOCAINE HYDROCHLORIDE 1 ML: 10 INJECTION, SOLUTION INFILTRATION; PERINEURAL at 11:00
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
## 2024-10-01 RX ORDER — FLUTICASONE PROPIONATE 50 MCG
SPRAY, SUSPENSION (ML) NASAL
Qty: 16 ML | Refills: 5 | Status: SHIPPED | OUTPATIENT
Start: 2024-10-01

## 2024-10-03 PROBLEM — J01.00 ACUTE MAXILLARY SINUSITIS: Status: RESOLVED | Noted: 2024-09-03 | Resolved: 2024-10-03

## 2024-10-23 ENCOUNTER — OFFICE VISIT (OUTPATIENT)
Dept: FAMILY MEDICINE CLINIC | Facility: HOSPITAL | Age: 64
End: 2024-10-23
Payer: COMMERCIAL

## 2024-10-23 VITALS
RESPIRATION RATE: 16 BRPM | SYSTOLIC BLOOD PRESSURE: 116 MMHG | OXYGEN SATURATION: 96 % | WEIGHT: 162 LBS | BODY MASS INDEX: 25.43 KG/M2 | HEIGHT: 67 IN | HEART RATE: 68 BPM | DIASTOLIC BLOOD PRESSURE: 78 MMHG | TEMPERATURE: 98.3 F

## 2024-10-23 DIAGNOSIS — R26.2 AMBULATORY DYSFUNCTION: ICD-10-CM

## 2024-10-23 DIAGNOSIS — J06.9 VIRAL UPPER RESPIRATORY INFECTION: ICD-10-CM

## 2024-10-23 DIAGNOSIS — Z00.00 ANNUAL PHYSICAL EXAM: ICD-10-CM

## 2024-10-23 DIAGNOSIS — E78.2 MIXED HYPERLIPIDEMIA: Primary | ICD-10-CM

## 2024-10-23 PROBLEM — K22.70 BARRETT'S ESOPHAGUS WITHOUT DYSPLASIA: Status: ACTIVE | Noted: 2024-10-23

## 2024-10-23 PROCEDURE — 99396 PREV VISIT EST AGE 40-64: CPT | Performed by: INTERNAL MEDICINE

## 2024-10-23 PROCEDURE — 99213 OFFICE O/P EST LOW 20 MIN: CPT | Performed by: INTERNAL MEDICINE

## 2024-10-23 NOTE — ASSESSMENT & PLAN NOTE
Patient developed fever as high as 101, sore throat, cough about a week ago.  She also developed nasal congestion especially the right side of her face and nostril.  Her fever resolved, sore throat resolved her nasal congestion is still present but she is able to blow her nose and has a clear discharge.    She denies shortness of breath, hemoptysis.    She still has a pain on the right side of her face from around her right ear to the jaw line.  She was seeing a dentist to evaluate her teeth.    She had clear nasal congestion on the right side of her nostril, I saw no evidence of bacterial upper respiratory infection.  Her lungs were completely clear to.  I doubt pneumonia.  Suspect she has a viral rhinosinusitis    Since she has been improving I did not start her antibiotics but I asked her to call if she develops temperatures more than 100.4, pains in her sinuses, shortness of breath.    If the dentist finds no etiology for the right-sided facial discomfort that she has had for months now I will refer her to ENT for thorough evaluation of her sinuses

## 2024-10-23 NOTE — PROGRESS NOTES
Adult Annual Physical  Name: Kayleigh Carrasquillo      : 1960      MRN: [id number]  Encounter Provider: Chery Tracy MD  Encounter Date: 10/23/2024   Encounter department: [facility] [address]    Assessment & Plan  Mixed hyperlipidemia    Orders:    Comprehensive metabolic panel; Future    Lipid panel; Future    Viral upper respiratory infection         Ambulatory dysfunction    Orders:    Ambulatory Referral to Physical Therapy; Future    Annual physical exam         1. Mixed hyperlipidemia  Assessment & Plan:  Patient has had hyperlipidemia since  at least.  She is physically very active denies chest pains or shortness of breath.  Her father had CABG at age 65.  I will recheck her cholesterol and if her cholesterol has not improved significantly with diet we discussed starting a statin medication and rechecking cholesterol in 6 months for primary prevention of myocardial infarction and strokes.  Orders:  -     Comprehensive metabolic panel; Future; Expected date: 10/25/2024  -     Lipid panel; Future; Expected date: 10/25/2024  2. Viral upper respiratory infection  Assessment & Plan:  Patient developed fever as high as 101, sore throat, cough about a week ago.  She also developed nasal congestion especially the right side of her face and nostril.  Her fever resolved, sore throat resolved her nasal congestion is still present but she is able to blow her nose and has a clear discharge.    She denies shortness of breath, hemoptysis.    She still has a pain on the right side of her face from around her right ear to the jaw line.  She was seeing a dentist to evaluate her teeth.    She had clear nasal congestion on the right side of her nostril, I saw no evidence of bacterial upper respiratory infection.  Her lungs were completely clear to.  I doubt pneumonia.  Suspect she has a viral rhinosinusitis    Since she has been improving I did not start her antibiotics but I asked her to call  if she develops temperatures more than 100.4, pains in her sinuses, shortness of breath.    If the dentist finds no etiology for the right-sided facial discomfort that she has had for months now I will refer her to ENT for thorough evaluation of her sinuses  3. Ambulatory dysfunction  Assessment & Plan:  She complains of feeling off balance and sometimes difficulty walking ever since she had her hysterectomy about 2 and half years ago.    Her strength was normal equal, DTRs were normal equal.  Finger-nose-finger and Romberg test were negative.  Her heel-to-toe walking was off.    I told patient that I doubted that she had had a stroke or brain tumor but she would benefit from physical therapy for balance and gait training.  I referred her to physical therapy  Orders:  -     Ambulatory Referral to Physical Therapy; Future  4. Annual physical exam     I obtained consent from patient today health maintenance visit in addition to acute problem and chronic problem visit.      Immunizations and preventive care screenings were discussed with patient today. Appropriate education was printed on patient's after visit summary.    Counseling:  Exercise: the importance of regular exercise/physical activity was discussed. Recommend exercise 3-5 times per week for at least 30 minutes.   She is up-to-date on breast cancer screening and colon cancer screening  Will repeat cholesterol and fasting blood sugar      Depression Screening and Follow-up Plan: Patient was screened for depression during today's encounter. They screened negative with a PHQ-2 score of 0.        History of Present Illness     Adult Annual Physical:  Patient presents for annual physical.     Diet and Physical Activity:  - Diet/Nutrition: well balanced diet.  - Exercise: vigorous cardiovascular exercise.    Depression Screening:  - PHQ-2 Score: 0    General Health:  - Sleep: sleeps well.  - Hearing: normal hearing bilateral ears.  - Vision: no vision problems.  -  "Dental: regular dental visits.    /GYN Health:  - Follows with GYN: yes.     Review of Systems   Constitutional:  Positive for fatigue and fever.   Respiratory:  Positive for cough. Negative for shortness of breath.    Cardiovascular:  Negative for chest pain.   Gastrointestinal:  Negative for abdominal pain and blood in stool.   Genitourinary:  Negative for dysuria, hematuria and vaginal bleeding.   Musculoskeletal:  Positive for gait problem.   Neurological:  Negative for weakness.         Objective     /78   Pulse 68   Temp 98.3 °F (36.8 °C) (Tympanic)   Resp 16   Ht 5' 7\" (1.702 m)   Wt 73.5 kg (162 lb)   LMP  (LMP Unknown)   SpO2 96%   BMI 25.37 kg/m²     Physical Exam  Constitutional:       General: She is not in acute distress.     Appearance: She is well-developed. She is not toxic-appearing.   HENT:      Head: Normocephalic.      Nose: Congestion and rhinorrhea present.      Mouth/Throat:      Mouth: Mucous membranes are moist.      Pharynx: No oropharyngeal exudate or posterior oropharyngeal erythema.   Eyes:      Conjunctiva/sclera: Conjunctivae normal.   Cardiovascular:      Rate and Rhythm: Normal rate and regular rhythm.      Heart sounds: No murmur heard.     No gallop.   Pulmonary:      Effort: Pulmonary effort is normal. No respiratory distress.      Breath sounds: No wheezing or rales.   Abdominal:      General: Bowel sounds are normal.      Palpations: Abdomen is soft.      Tenderness: There is no abdominal tenderness.   Musculoskeletal:      Cervical back: Neck supple.   Lymphadenopathy:      Cervical: No cervical adenopathy.   Skin:     General: Skin is warm.      Findings: No rash.   Neurological:      Mental Status: She is alert and oriented to person, place, and time.      Cranial Nerves: No cranial nerve deficit.      Motor: No weakness.      Coordination: Coordination normal.      Deep Tendon Reflexes: Reflexes normal.   Psychiatric:         Mood and Affect: Mood normal. "

## 2024-10-23 NOTE — ASSESSMENT & PLAN NOTE
Patient has had hyperlipidemia since 2020 at least.  She is physically very active denies chest pains or shortness of breath.  Her father had CABG at age 65.  I will recheck her cholesterol and if her cholesterol has not improved significantly with diet we discussed starting a statin medication and rechecking cholesterol in 6 months for primary prevention of myocardial infarction and strokes.

## 2024-10-23 NOTE — ASSESSMENT & PLAN NOTE
She complains of feeling off balance and sometimes difficulty walking ever since she had her hysterectomy about 2 and half years ago.    Her strength was normal equal, DTRs were normal equal.  Finger-nose-finger and Romberg test were negative.  Her heel-to-toe walking was off.    I told patient that I doubted that she had had a stroke or brain tumor but she would benefit from physical therapy for balance and gait training.  I referred her to physical therapy

## 2024-10-25 ENCOUNTER — APPOINTMENT (OUTPATIENT)
Dept: LAB | Facility: HOSPITAL | Age: 64
End: 2024-10-25
Payer: COMMERCIAL

## 2024-10-25 DIAGNOSIS — E78.2 MIXED HYPERLIPIDEMIA: Primary | ICD-10-CM

## 2024-10-25 DIAGNOSIS — E78.2 MIXED HYPERLIPIDEMIA: ICD-10-CM

## 2024-10-25 LAB
ALBUMIN SERPL BCG-MCNC: 4.3 G/DL (ref 3.5–5)
ALP SERPL-CCNC: 99 U/L (ref 34–104)
ALT SERPL W P-5'-P-CCNC: 31 U/L (ref 7–52)
ANION GAP SERPL CALCULATED.3IONS-SCNC: 8 MMOL/L (ref 4–13)
AST SERPL W P-5'-P-CCNC: 22 U/L (ref 13–39)
BILIRUB SERPL-MCNC: 0.48 MG/DL (ref 0.2–1)
BUN SERPL-MCNC: 13 MG/DL (ref 5–25)
CALCIUM SERPL-MCNC: 9.6 MG/DL (ref 8.4–10.2)
CHLORIDE SERPL-SCNC: 103 MMOL/L (ref 96–108)
CHOLEST SERPL-MCNC: 249 MG/DL
CO2 SERPL-SCNC: 30 MMOL/L (ref 21–32)
CREAT SERPL-MCNC: 0.88 MG/DL (ref 0.6–1.3)
GFR SERPL CREATININE-BSD FRML MDRD: 70 ML/MIN/1.73SQ M
GLUCOSE P FAST SERPL-MCNC: 112 MG/DL (ref 65–99)
HDLC SERPL-MCNC: 68 MG/DL
LDLC SERPL CALC-MCNC: 153 MG/DL (ref 0–100)
NONHDLC SERPL-MCNC: 181 MG/DL
POTASSIUM SERPL-SCNC: 4.1 MMOL/L (ref 3.5–5.3)
PROT SERPL-MCNC: 6.9 G/DL (ref 6.4–8.4)
SODIUM SERPL-SCNC: 141 MMOL/L (ref 135–147)
TRIGL SERPL-MCNC: 138 MG/DL

## 2024-10-25 PROCEDURE — 80061 LIPID PANEL: CPT

## 2024-10-25 PROCEDURE — 36415 COLL VENOUS BLD VENIPUNCTURE: CPT

## 2024-10-25 PROCEDURE — 80053 COMPREHEN METABOLIC PANEL: CPT

## 2024-10-25 RX ORDER — PRAVASTATIN SODIUM 20 MG
20 TABLET ORAL
Qty: 90 TABLET | Refills: 1 | Status: SHIPPED | OUTPATIENT
Start: 2024-10-25

## 2024-11-10 DIAGNOSIS — K21.9 GASTROESOPHAGEAL REFLUX DISEASE WITHOUT ESOPHAGITIS: ICD-10-CM

## 2024-11-11 RX ORDER — ESOMEPRAZOLE MAGNESIUM 40 MG/1
40 CAPSULE, DELAYED RELEASE ORAL DAILY
Qty: 90 CAPSULE | Refills: 1 | Status: SHIPPED | OUTPATIENT
Start: 2024-11-11

## 2024-11-22 PROBLEM — J06.9 VIRAL UPPER RESPIRATORY INFECTION: Status: RESOLVED | Noted: 2024-10-23 | Resolved: 2024-11-22

## 2024-12-09 ENCOUNTER — OFFICE VISIT (OUTPATIENT)
Dept: OBGYN CLINIC | Facility: CLINIC | Age: 64
End: 2024-12-09
Payer: COMMERCIAL

## 2024-12-09 VITALS
BODY MASS INDEX: 25.74 KG/M2 | DIASTOLIC BLOOD PRESSURE: 68 MMHG | WEIGHT: 164 LBS | HEIGHT: 67 IN | SYSTOLIC BLOOD PRESSURE: 108 MMHG

## 2024-12-09 DIAGNOSIS — M65.312 TRIGGER THUMB OF LEFT HAND: Primary | ICD-10-CM

## 2024-12-09 PROCEDURE — 99212 OFFICE O/P EST SF 10 MIN: CPT | Performed by: ORTHOPAEDIC SURGERY
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
## 2025-01-03 ENCOUNTER — TELEPHONE (OUTPATIENT)
Age: 65
End: 2025-01-03

## 2025-01-03 NOTE — TELEPHONE ENCOUNTER
Patient called requesting refill for esomeprazole. Patient made aware medication was refilled on 11/11/24 for 90 capsules with 1 refill at [facility] pharmacy. Patient instructed to contact the pharmacy to obtain refills of medication. Patient verbalized understanding.

## 2025-01-07 ENCOUNTER — TELEPHONE (OUTPATIENT)
Dept: GASTROENTEROLOGY | Facility: CLINIC | Age: 65
End: 2025-01-07

## 2025-01-07 ENCOUNTER — OFFICE VISIT (OUTPATIENT)
Dept: GASTROENTEROLOGY | Facility: CLINIC | Age: 65
End: 2025-01-07
Payer: COMMERCIAL

## 2025-01-07 VITALS
DIASTOLIC BLOOD PRESSURE: 80 MMHG | SYSTOLIC BLOOD PRESSURE: 116 MMHG | HEIGHT: 67 IN | WEIGHT: 164 LBS | BODY MASS INDEX: 25.74 KG/M2

## 2025-01-07 DIAGNOSIS — K21.9 GASTROESOPHAGEAL REFLUX DISEASE WITHOUT ESOPHAGITIS: Primary | ICD-10-CM

## 2025-01-07 DIAGNOSIS — Z12.12 SCREENING FOR COLORECTAL CANCER: ICD-10-CM

## 2025-01-07 DIAGNOSIS — K59.00 CONSTIPATION, UNSPECIFIED CONSTIPATION TYPE: ICD-10-CM

## 2025-01-07 DIAGNOSIS — R13.10 DYSPHAGIA, UNSPECIFIED TYPE: ICD-10-CM

## 2025-01-07 DIAGNOSIS — Z12.11 SCREENING FOR COLORECTAL CANCER: ICD-10-CM

## 2025-01-07 PROCEDURE — 99214 OFFICE O/P EST MOD 30 MIN: CPT | Performed by: INTERNAL MEDICINE

## 2025-01-07 RX ORDER — ESOMEPRAZOLE MAGNESIUM 40 MG/1
40 CAPSULE, DELAYED RELEASE ORAL DAILY
Qty: 90 CAPSULE | Refills: 3 | Status: SHIPPED | OUTPATIENT
Start: 2025-01-07 | End: 2026-01-02

## 2025-01-07 RX ORDER — SODIUM CHLORIDE, SODIUM LACTATE, POTASSIUM CHLORIDE, CALCIUM CHLORIDE 600; 310; 30; 20 MG/100ML; MG/100ML; MG/100ML; MG/100ML
125 INJECTION, SOLUTION INTRAVENOUS CONTINUOUS
Status: CANCELLED | OUTPATIENT
Start: 2025-01-07

## 2025-01-07 NOTE — PATIENT INSTRUCTIONS
Fiber Supplementation Instructions     Soluble fiber (such as Benefiber®) is easy to incorporate into your diet as it is tasteless and can be mixed with food or drink. Begin with 1 tbsp once a day and then gradually increase to 1 tbsp 3 times a day over the period of a few days. Stir Benefiber® into 4-8 ounces of liquid (carbonated beverages are not recommended) or mix with soft food (hot or cold). Stir well until dissolved. Increase your fluid intake as necessary to ensure you are drinking 7-8 glasses of water every day. Supplemental fiber should be taken with meals for greatest benefit. Many less-expensive generic versions of Benefiber® are available with similar active components (pictured below)      Psyllium is also another type of fiber supplementation that you can try as well. It comes in formulations of capsules, Gummies, powders.  I would read the back of the label to see the dosing of the fiber supplementation.  The goal for amount of fiber a day is 25 to 50 g of fiber daily.          GERD Diet: Foods That Help with Acid Reflux (Heartburn)    Getting a case of acid reflux (heartburn) once in a while isn't unusual, but some people suffer from burning discomfort, bloating and belching almost every time they eat. About 20% of the population has gastroesophageal reflux disease (GERD), a chronic acid reflux condition that's diagnosed by a doctor.    Normally, the esophageal sphincter (a muscular tube that lets food pass into the stomach and then cinches shut to block it from coming back up) protects the esophagus from stomach acid. However, if the sphincter relaxes, food can push upward through the loosened opening and cause acid reflux.    Diet plays a major role in controlling acid reflux symptoms and is the first line of therapy used for people with GERD    Foods That May Cause Heartburn  Foods commonly known to be heartburn triggers cause the esophageal sphincter to relax and delay the digestive process,  "letting food sit in the stomach longer, says Echo. The worst culprits? Foods that are high in fat, salt or spice such as:    Fried food  Fast food  Pizza  Potato chips and other processed snacks  Chili powder and pepper (white, black, cayenne)  Fatty meats such as spears and sausage  Cheese  Other foods that can cause the same problem include:    Tomato-based sauces  Citrus fruits  Chocolate  Peppermint  Carbonated beverages    \"Moderation is key since many people may not be able to or want to completely eliminate these foods,\" says Echo. \"But try to avoid eating problem foods late in the evening closer to bedtime, so they're not sitting in your stomach and then coming up your esophagus when you lay down at night. It's also a good idea to eat small frequent meals instead of bigger, heavier meals and avoid late-night dinners and bedtime snacks.\"    Foods That Help Prevent Acid Reflux  Good news: There are plenty of things you can eat to help prevent acid reflux. Stock your kitchen with foods from these three categories:    High-fiber foods  Fibrous foods make you feel full so you're less likely to overeat, which may contribute to heartburn. So, load up on healthy fiber from these foods:    Whole grains such as oatmeal, couscous and brown rice.  Root vegetables such as sweet potatoes, carrots and beets.  Green vegetables such as asparagus, broccoli and green beans.  a bowl of mixed nuts   Alkaline foods  Foods fall somewhere along the pH scale (an indicator of acid levels). Those that have a low pH are acidic and more likely to cause reflux. Those with higher pH are alkaline and can help offset strong stomach acid.     Alkaline foods include:    Bananas  Melons  Cauliflower  Fennel  Nuts  a bowl of cut watermelon  Watery foods    Eating foods that contain a lot of water can dilute and weaken stomach acid. Choose foods such as:    Celery  Cucumber  Lettuce  Watermelon  Broth-based soups  Herbal tea  Heartburn Home " Remedies    People with heartburn commonly reach for antacids, over-the-counter medications that neutralize stomach acid. But eating certain foods may also offer relief from symptoms. Consider trying the following:    A cup of mary kay tea    Mary Kay is one of the best digestive aids because of its medicinal properties. It's alkaline in nature and anti-inflammatory, which eases irritation in the digestive tract. Try sipping mary kay tea when you feel heartburn coming on.    Apple cider vinegar and apples    While there isn't enough research to prove that drinking apple cider vinegar works for acid reflux, many people swear that it helps. However, you should never drink it at full concentration because it's a strong acid that can irritate the esophagus. Instead, put a small amount in warm water and drink it with meals.    A cup of lemon water with honey    Lemon juice is generally considered very acidic, but a small amount of lemon juice mixed with warm water and honey has an alkalizing effect that neutralizes stomach acid. Also, honey has natural antioxidants, which protect the health of cells.
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
## 2025-01-07 NOTE — TELEPHONE ENCOUNTER
Scheduled date of EGD(as of today):01/13/25  Physician performing EGD: Allen  Location of EGD:[facility]  Instructions reviewed with patient by: folder - DS  Clearances: NONE
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
## 2025-01-09 ENCOUNTER — ANESTHESIA (OUTPATIENT)
Dept: ANESTHESIOLOGY | Facility: AMBULATORY SURGERY CENTER | Age: 65
End: 2025-01-09

## 2025-01-09 ENCOUNTER — ANESTHESIA EVENT (OUTPATIENT)
Dept: ANESTHESIOLOGY | Facility: AMBULATORY SURGERY CENTER | Age: 65
End: 2025-01-09

## 2025-01-09 ENCOUNTER — TELEPHONE (OUTPATIENT)
Dept: GASTROENTEROLOGY | Facility: AMBULATORY SURGERY CENTER | Age: 65
End: 2025-01-09

## 2025-01-13 ENCOUNTER — ANESTHESIA (OUTPATIENT)
Dept: GASTROENTEROLOGY | Facility: AMBULATORY SURGERY CENTER | Age: 65
End: 2025-01-13

## 2025-01-13 ENCOUNTER — ANESTHESIA EVENT (OUTPATIENT)
Dept: GASTROENTEROLOGY | Facility: AMBULATORY SURGERY CENTER | Age: 65
End: 2025-01-13

## 2025-01-13 ENCOUNTER — HOSPITAL ENCOUNTER (OUTPATIENT)
Dept: GASTROENTEROLOGY | Facility: AMBULATORY SURGERY CENTER | Age: 65
Discharge: HOME | End: 2025-01-13
Attending: INTERNAL MEDICINE
Payer: COMMERCIAL

## 2025-01-13 VITALS
RESPIRATION RATE: 20 BRPM | HEART RATE: 69 BPM | OXYGEN SATURATION: 97 % | TEMPERATURE: 98.9 F | WEIGHT: 164 LBS | DIASTOLIC BLOOD PRESSURE: 58 MMHG | SYSTOLIC BLOOD PRESSURE: 100 MMHG | HEIGHT: 67 IN | BODY MASS INDEX: 25.74 KG/M2

## 2025-01-13 DIAGNOSIS — R13.10 DYSPHAGIA, UNSPECIFIED TYPE: ICD-10-CM

## 2025-01-13 DIAGNOSIS — E78.2 MIXED HYPERLIPIDEMIA: ICD-10-CM

## 2025-01-13 DIAGNOSIS — K21.9 GASTROESOPHAGEAL REFLUX DISEASE WITHOUT ESOPHAGITIS: ICD-10-CM

## 2025-01-13 PROCEDURE — 43239 EGD BIOPSY SINGLE/MULTIPLE: CPT | Performed by: INTERNAL MEDICINE

## 2025-01-13 PROCEDURE — 88305 TISSUE EXAM BY PATHOLOGIST: CPT | Performed by: STUDENT IN AN ORGANIZED HEALTH CARE EDUCATION/TRAINING PROGRAM

## 2025-01-13 RX ORDER — SODIUM CHLORIDE, SODIUM LACTATE, POTASSIUM CHLORIDE, CALCIUM CHLORIDE 600; 310; 30; 20 MG/100ML; MG/100ML; MG/100ML; MG/100ML
125 INJECTION, SOLUTION INTRAVENOUS CONTINUOUS
Status: DISCONTINUED | OUTPATIENT
Start: 2025-01-13 | End: 2025-01-17 | Stop reason: HOSPADM

## 2025-01-13 RX ORDER — PRAVASTATIN SODIUM 20 MG
20 TABLET ORAL
Qty: 90 TABLET | Refills: 1 | Status: SHIPPED | OUTPATIENT
Start: 2025-01-13

## 2025-01-13 RX ORDER — PROPOFOL 10 MG/ML
INJECTION, EMULSION INTRAVENOUS AS NEEDED
Status: DISCONTINUED | OUTPATIENT
Start: 2025-01-13 | End: 2025-01-13

## 2025-01-13 RX ORDER — SODIUM CHLORIDE, SODIUM LACTATE, POTASSIUM CHLORIDE, CALCIUM CHLORIDE 600; 310; 30; 20 MG/100ML; MG/100ML; MG/100ML; MG/100ML
50 INJECTION, SOLUTION INTRAVENOUS CONTINUOUS
Status: DISCONTINUED | OUTPATIENT
Start: 2025-01-13 | End: 2025-01-17 | Stop reason: HOSPADM

## 2025-01-13 RX ADMIN — SODIUM CHLORIDE, SODIUM LACTATE, POTASSIUM CHLORIDE, CALCIUM CHLORIDE 50 ML/HR: 600; 310; 30; 20 INJECTION, SOLUTION INTRAVENOUS at 10:40

## 2025-01-13 RX ADMIN — PROPOFOL 150 MG: 10 INJECTION, EMULSION INTRAVENOUS at 10:53

## 2025-01-13 RX ADMIN — PROPOFOL 50 MG: 10 INJECTION, EMULSION INTRAVENOUS at 10:57

## 2025-01-13 RX ADMIN — SODIUM CHLORIDE, SODIUM LACTATE, POTASSIUM CHLORIDE, CALCIUM CHLORIDE: 600; 310; 30; 20 INJECTION, SOLUTION INTRAVENOUS at 11:00

## 2025-01-13 RX ADMIN — SODIUM CHLORIDE, SODIUM LACTATE, POTASSIUM CHLORIDE, CALCIUM CHLORIDE: 600; 310; 30; 20 INJECTION, SOLUTION INTRAVENOUS at 10:38

## 2025-01-13 NOTE — ANESTHESIA POSTPROCEDURE EVALUATION
Post-Op Assessment Note    CV Status:  Stable  Pain Score: 0    Pain management: adequate       Mental Status:  Alert and awake   Hydration Status:  Euvolemic   PONV Controlled:  Controlled   Airway Patency:  Patent     Post Op Vitals Reviewed: Yes    No anethesia notable event occurred.    Staff: CRNA           Last Filed PACU Vitals:  Vitals Value Taken Time   Temp 98 1101   Pulse 68    /65    Resp 16    SpO2 99

## 2025-01-13 NOTE — TELEPHONE ENCOUNTER
Medication: pravastatin (PRAVACHOL) 20 mg tablet     Dose/Frequency:     Take 1 tablet (20 mg total) by mouth daily at bedtime       Quantity: 90    Pharmacy: [facility]/pharmacy #[id number] - [place] - [address] [facility] [phone]     Office:   [x] PCP/Provider - Dr Tracy  [] Speciality/Provider -     Does the patient have enough for 3 days?   [] Yes   [x] No - Send as HP to POD

## 2025-01-13 NOTE — INTERVAL H&P NOTE
H&P reviewed. After examining the patient I find no changes in the patients condition since the H&P had been written.    Vitals:    01/13/25 1028   BP: 130/77   Pulse: 81   Resp: 20   Temp: 98.9 °F (37.2 °C)   SpO2: 98%

## 2025-01-13 NOTE — ANESTHESIA PREPROCEDURE EVALUATION
Procedure:  EGD    Relevant Problems   CARDIO   (+) Mixed hyperlipidemia      GI/HEPATIC   (+) Gastroesophageal reflux disease without esophagitis      NEURO/PSYCH   (+) Chronic pain of left thumb      PULMONARY   (+) NAEL (obstructive sleep apnea)      Patient denies h/o NAEL    H/o Graves disease in remission for 6 years    Physical Exam    Airway    Mallampati score: III  TM Distance: <3 FB  Neck ROM: full     Dental   Comment: None loose, No notable dental hx     Cardiovascular      Pulmonary      Other Findings  post-pubertal.      Anesthesia Plan  ASA Score- 2     Anesthesia Type- IV sedation with anesthesia with ASA Monitors.         Additional Monitors:     Airway Plan:     Comment: NPO after Mn    Patient educated on the possibility for awareness under sedation and of the possibility of airway intervention in the event of an airway or procedural emergency    Available labwork, imaging, and diagnostic studies relevant to the procedure reviewed including prior anesthetic records. Patient is acceptable for the intended procedure      .       Plan Factors-Exercise tolerance (METS): >4 METS.    Chart reviewed.    Patient summary reviewed.    Patient is not a current smoker.              Induction- intravenous.    Postoperative Plan-     Perioperative Resuscitation Plan - Level 1 - Full Code.       Informed Consent- Anesthetic plan and risks discussed with patient.  I personally reviewed this patient with the CRNA. Discussed and agreed on the Anesthesia Plan with the CRNA..

## 2025-01-15 PROCEDURE — 88305 TISSUE EXAM BY PATHOLOGIST: CPT | Performed by: STUDENT IN AN ORGANIZED HEALTH CARE EDUCATION/TRAINING PROGRAM

## 2025-01-16 ENCOUNTER — RESULTS FOLLOW-UP (OUTPATIENT)
Age: 65
End: 2025-01-16

## 2025-01-21 NOTE — TELEPHONE ENCOUNTER
Pt. Calling for brush biopsy results, advised biopsy is not back yet but a message will be sent to Dr. Villa for update, pt. Verbalized understanding

## 2025-01-23 ENCOUNTER — OFFICE VISIT (OUTPATIENT)
Dept: URGENT CARE | Facility: CLINIC | Age: 65
End: 2025-01-23
Payer: COMMERCIAL

## 2025-01-23 ENCOUNTER — NURSE TRIAGE (OUTPATIENT)
Age: 65
End: 2025-01-23

## 2025-01-23 VITALS
HEART RATE: 66 BPM | SYSTOLIC BLOOD PRESSURE: 130 MMHG | TEMPERATURE: 97.2 F | OXYGEN SATURATION: 98 % | DIASTOLIC BLOOD PRESSURE: 78 MMHG | RESPIRATION RATE: 18 BRPM

## 2025-01-23 DIAGNOSIS — R60.0 SALIVARY GLAND SWELLING: Primary | ICD-10-CM

## 2025-01-23 DIAGNOSIS — R21 MACULOPAPULAR RASH, LOCALIZED: ICD-10-CM

## 2025-01-23 PROCEDURE — 99213 OFFICE O/P EST LOW 20 MIN: CPT

## 2025-01-23 RX ORDER — METHYLPREDNISOLONE 4 MG/1
TABLET ORAL
Qty: 21 TABLET | Refills: 0 | Status: SHIPPED | OUTPATIENT
Start: 2025-01-23

## 2025-01-23 NOTE — TELEPHONE ENCOUNTER
"Pt. Complaining of sore throat  that started Friday after EGD, pt. Reports on Sunday she was  having bumps to the back of her tongue and when she swallows it makes it difficult, pt. Denies fever, pt. Feels bumps are getting bigger, advised to go to urgent care for eval, pt. Agreeable, pt. Recently had IV and feels bumps to area where IV was, no rash but localized bumps to IV site, pt. Will go to urgant care and follow up as needed   Reason for Disposition   Patient sounds very sick or weak to the triager    Answer Assessment - Initial Assessment Questions  1. SYMPTOM: \"What's the main symptom you're concerned about?\" (e.g., chapped lips, dry mouth, lump, sores)      Bumps to back of tongue, trouble swallowing, localized bumps to IV site   2. ONSET: \"When did the  symptoms  start?\"      Last Friday   3. PAIN: \"Is there any pain?\" If Yes, ask: \"How bad is it?\" (Scale: 1-10; mild, moderate, severe)      Yes to bumps on back of tongue   4. CAUSE: \"What do you think is causing the symptoms?\"      Unsure   5. OTHER SYMPTOMS: \"Do you have any other symptoms?\" (e.g., fever, sore throat, toothache, swelling)      Sore throat   6. PREGNANCY: \"Is there any chance you are pregnant?\" \"When was your last menstrual period?\"      Denies    Protocols used: Mouth Symptoms-Adult-[place]    "

## 2025-01-23 NOTE — PATIENT INSTRUCTIONS
Take steroids as prescribed.  *Avoid NSAIDS (Motrin, ibuprofen, Advil, Aleve) while taking steroids. You may take Tylenol.*    Warm salt water gargles  Throat lozenges  Can try lemon drops to increase flow of saliva  Drinking cool liquids  Resting voice    Follow-up with GI or PCP in 3-5 days.    Go to the ED for any worsening symptoms, especially if you notice visible bleeding or develop fevers.

## 2025-01-23 NOTE — TELEPHONE ENCOUNTER
Patient called in she had an EGD done 1/13 and she has bumps on the back of her throat making it hard for her to swallow transferred to RN

## 2025-01-27 ENCOUNTER — TELEPHONE (OUTPATIENT)
Age: 65
End: 2025-01-27

## 2025-01-27 NOTE — TELEPHONE ENCOUNTER
Pt called in with concerns to possible medication reaction. She started the pravastatin on 1.15 for a rash on her throat. Said that there are bumps on the back of her throat. She stopped taking the medication yesterday. She thought originally it could be strep. Went to  on 1/23 and was told to take prednisone. She has been taking that and is still on it. Stopped taking the pravastatin yesterday. Said that the rash on her arm went away but she still has bumps on her throat. Believes its from the medication. Please advise if there is another medication she can take in place.

## 2025-01-28 ENCOUNTER — OFFICE VISIT (OUTPATIENT)
Dept: FAMILY MEDICINE CLINIC | Facility: HOSPITAL | Age: 65
End: 2025-01-28
Payer: COMMERCIAL

## 2025-01-28 VITALS
HEART RATE: 71 BPM | SYSTOLIC BLOOD PRESSURE: 116 MMHG | RESPIRATION RATE: 16 BRPM | WEIGHT: 159 LBS | BODY MASS INDEX: 24.96 KG/M2 | OXYGEN SATURATION: 95 % | HEIGHT: 67 IN | DIASTOLIC BLOOD PRESSURE: 78 MMHG | TEMPERATURE: 97.9 F

## 2025-01-28 DIAGNOSIS — E78.2 MIXED HYPERLIPIDEMIA: Primary | ICD-10-CM

## 2025-01-28 DIAGNOSIS — K21.9 GASTROESOPHAGEAL REFLUX DISEASE WITHOUT ESOPHAGITIS: ICD-10-CM

## 2025-01-28 PROCEDURE — 99213 OFFICE O/P EST LOW 20 MIN: CPT | Performed by: INTERNAL MEDICINE

## 2025-01-28 RX ORDER — ATORVASTATIN CALCIUM 20 MG/1
20 TABLET, FILM COATED ORAL DAILY
Qty: 7 TABLET | Refills: 0 | Status: SHIPPED | OUTPATIENT
Start: 2025-01-28

## 2025-01-28 NOTE — ASSESSMENT & PLAN NOTE
Patient has hyperlipidemia and family history of coronary artery disease.  Prescribe pravastatin October but she was only able to start taking it on the January 15th.  Couple of days later she noticed bumps on the back of her tongue and a rash on the right forearm.  She had an EGD on January 13.  She went to urgent care on January 23 and was given Medrol Dosepak for the rash on the right forearm that resolved by now.  The bumps on the back of her tongue were not getting better on Medrol Dosepak therefore she discontinued taking pravastatin on January 25.  Since then she feels that the bumps are getting better in the back of her tongue.    I saw no evidence of oral mucosal edema, pharyngeal infection, angioedema.    I am not sure if she had an allergic reaction to pravastatin but I asked her not to take pravastatin anymore.    She had no rash on the right forearm at all today.    Advised her to stop taking Medrol Dosepak    I advised her to wait for 2 weeks and try taking atorvastatin 20 mg daily.  I will give her 7 pills and asked her to call if she develops any side effects on atorvastatin.  Will consider giving her more atorvastatin if she has no side effects or allergic reactions      Orders:  •  atorvastatin (LIPITOR) 20 mg tablet; Take 1 tablet (20 mg total) by mouth daily

## 2025-01-28 NOTE — PROGRESS NOTES
Name: Kayleigh Carrasquillo      : 1960      MRN: [id number]  Encounter Provider: Chery Tracy MD  Encounter Date: 2025   Encounter department: [facility] [address]  :  Assessment & Plan  Mixed hyperlipidemia  Patient has hyperlipidemia and family history of coronary artery disease.  Prescribe pravastatin October but she was only able to start taking it on the .  Couple of days later she noticed bumps on the back of her tongue and a rash on the right forearm.  She had an EGD on .  She went to urgent care on  and was given Medrol Dosepak for the rash on the right forearm that resolved by now.  The bumps on the back of her tongue were not getting better on Medrol Dosepak therefore she discontinued taking pravastatin on .  Since then she feels that the bumps are getting better in the back of her tongue.    I saw no evidence of oral mucosal edema, pharyngeal infection, angioedema.    I am not sure if she had an allergic reaction to pravastatin but I asked her not to take pravastatin anymore.    She had no rash on the right forearm at all today.    Advised her to stop taking Medrol Dosepak    I advised her to wait for 2 weeks and try taking atorvastatin 20 mg daily.  I will give her 7 pills and asked her to call if she develops any side effects on atorvastatin.  Will consider giving her more atorvastatin if she has no side effects or allergic reactions      Orders:  •  atorvastatin (LIPITOR) 20 mg tablet; Take 1 tablet (20 mg total) by mouth daily    Gastroesophageal reflux disease without esophagitis  Her GERD is stable.  The gastric mucosa showed no intestinal metaplasia or dysplasia from .  She will continue esomeprazole             Depression Screening and Follow-up Plan: Patient was screened for depression during today's encounter. They screened negative with a PHQ-2 score of 0.      History of Present Illness   HPI  Review of  "Systems    Objective   /78   Pulse 71   Temp 97.9 °F (36.6 °C) (Tympanic)   Resp 16   Ht 5' 7\" (1.702 m)   Wt 72.1 kg (159 lb)   LMP  (LMP Unknown)   SpO2 95%   BMI 24.90 kg/m²      Physical Exam  Constitutional:       General: She is not in acute distress.     Appearance: She is not toxic-appearing.   HENT:      Mouth/Throat:      Mouth: Mucous membranes are moist.      Pharynx: No oropharyngeal exudate or posterior oropharyngeal erythema.   Musculoskeletal:      Cervical back: Neck supple.   Lymphadenopathy:      Cervical: No cervical adenopathy.   Neurological:      Mental Status: She is alert.         "

## 2025-01-28 NOTE — ASSESSMENT & PLAN NOTE
Her GERD is stable.  The gastric mucosa showed no intestinal metaplasia or dysplasia from January 13.  She will continue esomeprazole

## 2025-01-28 NOTE — PATIENT INSTRUCTIONS
Please wait for 2 weeks before getting a try to atorvastatin.  Let me know if you develop any side effects on atorvastatin over a week.   if you have no sign of allergic reactions I will give you more atorvastatin!

## 2025-05-12 ENCOUNTER — TELEPHONE (OUTPATIENT)
Age: 65
End: 2025-05-12

## 2025-05-12 NOTE — TELEPHONE ENCOUNTER
Patient would like to know if Dr Tracy takes . She said he did not in the past.    She's coming in tomorrow, 5/13/25 for an appt. She has a different insurance now but was thinking about going back on .    Thanks

## 2025-05-13 ENCOUNTER — OFFICE VISIT (OUTPATIENT)
Dept: FAMILY MEDICINE CLINIC | Facility: HOSPITAL | Age: 65
End: 2025-05-13
Payer: COMMERCIAL

## 2025-05-13 VITALS
WEIGHT: 162 LBS | OXYGEN SATURATION: 96 % | HEART RATE: 74 BPM | BODY MASS INDEX: 25.37 KG/M2 | DIASTOLIC BLOOD PRESSURE: 82 MMHG | SYSTOLIC BLOOD PRESSURE: 150 MMHG

## 2025-05-13 DIAGNOSIS — E05.00 GRAVES DISEASE: ICD-10-CM

## 2025-05-13 DIAGNOSIS — R03.0 ELEVATED BLOOD PRESSURE READING IN OFFICE WITHOUT DIAGNOSIS OF HYPERTENSION: ICD-10-CM

## 2025-05-13 DIAGNOSIS — E78.2 MIXED HYPERLIPIDEMIA: ICD-10-CM

## 2025-05-13 DIAGNOSIS — R00.2 PALPITATIONS: Primary | ICD-10-CM

## 2025-05-13 LAB — VENTRICULAR RATE: 72 DEGREES

## 2025-05-13 PROCEDURE — 99213 OFFICE O/P EST LOW 20 MIN: CPT | Performed by: NURSE PRACTITIONER

## 2025-05-13 PROCEDURE — 93000 ELECTROCARDIOGRAM COMPLETE: CPT | Performed by: NURSE PRACTITIONER

## 2025-05-13 RX ORDER — METOPROLOL SUCCINATE 25 MG/1
25 TABLET, EXTENDED RELEASE ORAL DAILY
Qty: 30 TABLET | Refills: 11 | Status: SHIPPED | OUTPATIENT
Start: 2025-05-13

## 2025-05-13 NOTE — PROGRESS NOTES
[facility]   [place] [facility]    Assessment/Plan:   1. Palpitations  Assessment & Plan:  Hx Graves (in remission x6 yrs), NAEL disproved per home study in 2022, continues 1/2 ppd tobacco use, hypercholesterolemia.  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   Typically very active and noticed over the last 3 days episodic sensation of fluttering in her chest.   No chest pain/pressure/dyspnea associated however fluttering can make her catch her breath at times.  Occasional lightheadedness.  HA x12 hours yesterday so didn't work out.  Does endorse recent increased stress due to family interpersonal relations. Did valsalva maneuver yesterday with temporary improvement.    Bp elevated x2 readings in office.  Denies white coat syndrome.  Bp at home yesterday with upper arm cuff less than 5 years old 107/68 and HR in the 80s.    Not currently on statin due to side effects. Had reaction to atorvastatin so stopped that and now doing lifestyle.    - NAD, elevated blood pressure x 2 readings in office.  Heart rate 70-80 with occasional ectopy.  Carotid, radial, DP, PT +2 pulses.  Lungs CTA and euvolemic on exam.  -EKG completed in office sinus rhythm with suggestive premature supraventricular complexes  -Will check blood work including CMP, thyroid function, magnesium  -Will start Toprol 25 mg p.o. daily.  Will refer to cardiology.  -Will see back in office in 1 to 3 days.  Reviewed red flags and when to seek emergent services with verbalized understanding  Orders:  -     POCT ECG  -     Comprehensive metabolic panel; Future  -     Magnesium; Future  -     Ambulatory Referral to Cardiology; Future  -     Comprehensive metabolic panel  -     Magnesium  -     metoprolol succinate (TOPROL-XL) 25 mg 24 hr tablet; Take 1 tablet (25 mg total) by mouth daily  2. Mixed hyperlipidemia  -     Ambulatory Referral to Cardiology; Future  3. Graves disease  -     TSH, 3rd generation with Free T4 reflex; Future  -     TSH, 3rd  generation with Free T4 reflex  4. Elevated blood pressure reading in office without diagnosis of hypertension  Assessment & Plan:  Elevated blood pressure in office x 2 readings without history of hypertension. OWEN 142/74 and 150/82.  Does have history of Graves' but reports this has been in remission x 6 years.  Denies chest pressure pain but has been having episodic palpitations for last 3 days.  Associated proximal lightheadedness and headache.  See palpitation A/P      Recheck labowrk as ordered  Referral to cardiology   Reviewed red flags- go to ER   Start toprol 25mg daily.   Return in about 3 days (around 5/16/2025) for Recheck.  Patient may call or return to office with any questions or concerns.     ______________________________________________________________________  Subjective:     Patient ID: Kayleigh Carrasquillo is a 64 y.o. female.  Kayleigh Carrasquillo  Chief Complaint   Patient presents with    Palpitations     Off/on x 3 days. Denies pain. No shortness of breath. 12 hour headache. Gone now.;      Per A/P             The following portions of the patient's history were reviewed and updated as appropriate: allergies, current medications, past family history, past medical history, past social history, past surgical history, and problem list.    Review of Systems   Constitutional: Negative.  Negative for activity change, appetite change, chills, fatigue and fever.   HENT: Negative.  Negative for congestion, ear pain, postnasal drip and sinus pain.    Eyes: Negative.    Respiratory: Negative.  Negative for cough and shortness of breath.    Cardiovascular:  Positive for palpitations. Negative for chest pain and leg swelling.   Gastrointestinal: Negative.  Negative for constipation and diarrhea.   Endocrine: Negative.    Genitourinary: Negative.  Negative for dysuria.   Musculoskeletal: Negative.    Skin: Negative.    Allergic/Immunologic: Negative.  Negative for immunocompromised state.   Neurological:   Positive for light-headedness and headaches. Negative for dizziness, syncope, weakness and numbness.   Hematological: Negative.    Psychiatric/Behavioral: Negative.  Negative for sleep disturbance.          Objective:      Vitals:    05/13/25 1459   BP: 150/82   Pulse: 74   SpO2: 96%      Physical Exam  Vitals and nursing note reviewed.   Constitutional:       General: She is awake. She is not in acute distress.     Appearance: Normal appearance. She is not ill-appearing.   HENT:      Head: Normocephalic and atraumatic.      Right Ear: Tympanic membrane, ear canal and external ear normal.      Left Ear: Tympanic membrane, ear canal and external ear normal.      Nose: Nose normal.      Mouth/Throat:      Mouth: Mucous membranes are moist.      Pharynx: Oropharynx is clear.   Eyes:      Extraocular Movements: Extraocular movements intact.      Conjunctiva/sclera: Conjunctivae normal.      Pupils: Pupils are equal, round, and reactive to light.   Cardiovascular:      Rate and Rhythm: Normal rate and regular rhythm.      Pulses: Normal pulses.           Carotid pulses are 2+ on the right side and 2+ on the left side.       Radial pulses are 2+ on the right side and 2+ on the left side.        Dorsalis pedis pulses are 2+ on the right side and 2+ on the left side.        Posterior tibial pulses are 2+ on the right side and 2+ on the left side.      Heart sounds: Normal heart sounds.      Comments: Occasional ectopy noted.   Pulmonary:      Effort: Pulmonary effort is normal.      Breath sounds: Normal breath sounds. No wheezing or rales.   Abdominal:      General: Bowel sounds are normal.      Palpations: Abdomen is soft.   Musculoskeletal:         General: Normal range of motion.      Cervical back: Normal range of motion and neck supple.      Right lower leg: No edema.      Left lower leg: No edema.   Skin:     General: Skin is warm and dry.   Neurological:      General: No focal deficit present.      Mental Status:  "She is alert and oriented to person, place, and time.   Psychiatric:         Mood and Affect: Mood normal.         Behavior: Behavior normal. Behavior is cooperative.         Thought Content: Thought content normal.         Judgment: Judgment normal.           Portions of the record may have been created with voice recognition software. Occasional wrong word or \"sound alike\" substitutions may have occurred due to the inherent limitations of voice recognition software. Please review the chart carefully and recognize, using context, where substitutions/typographical errors may have occurred.       "

## 2025-05-13 NOTE — PATIENT INSTRUCTIONS
Recheck labowrk as ordered  Referral to cardiology   Reviewed red flags- go to ER   Start toprol 25mg daily.

## 2025-05-13 NOTE — ASSESSMENT & PLAN NOTE
Elevated blood pressure in office x 2 readings without history of hypertension. OWEN 142/74 and 150/82.  Does have history of Graves' but reports this has been in remission x 6 years.  Denies chest pressure pain but has been having episodic palpitations for last 3 days.  Associated proximal lightheadedness and headache.  See palpitation A/P

## 2025-05-13 NOTE — ASSESSMENT & PLAN NOTE
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   No chest pain/pressure/dyspnea associated however fluttering can make her catch her breath at times.  Occasional lightheadedness.  HA x12 hours yesterday so didn't work out.  Does endorse recent increased stress due to family interpersonal relations. Did valsalva maneuver yesterday with temporary improvement.    Bp elevated x2 readings in office.  Denies white coat syndrome.  Bp at home yesterday with upper arm cuff less than 5 years old 107/68 and HR in the 80s.    Not currently on statin due to side effects. Had reaction to atorvastatin so stopped that and now doing lifestyle.    - NAD, elevated blood pressure x 2 readings in office.  Heart rate 70-80 with occasional ectopy.  Carotid, radial, DP, PT +2 pulses.  Lungs CTA and euvolemic on exam.  -EKG completed in office sinus rhythm with suggestive premature supraventricular complexes  -Will check blood work including CMP, thyroid function, magnesium  -Will start Toprol 25 mg p.o. daily.  Will refer to cardiology.  -Will see back in office in 1 to 3 days.  Reviewed red flags and when to seek emergent services with verbalized understanding

## 2025-05-15 ENCOUNTER — OFFICE VISIT (OUTPATIENT)
Dept: FAMILY MEDICINE CLINIC | Facility: HOSPITAL | Age: 65
End: 2025-05-15
Payer: COMMERCIAL

## 2025-05-15 VITALS
WEIGHT: 162 LBS | BODY MASS INDEX: 25.43 KG/M2 | HEART RATE: 70 BPM | SYSTOLIC BLOOD PRESSURE: 132 MMHG | HEIGHT: 67 IN | OXYGEN SATURATION: 95 % | DIASTOLIC BLOOD PRESSURE: 78 MMHG

## 2025-05-15 DIAGNOSIS — M54.2 NECK PAIN ON RIGHT SIDE: ICD-10-CM

## 2025-05-15 DIAGNOSIS — R00.2 PALPITATIONS: ICD-10-CM

## 2025-05-15 DIAGNOSIS — R42 DIZZINESS: ICD-10-CM

## 2025-05-15 DIAGNOSIS — R51.9 NEW ONSET OF HEADACHES AFTER AGE 50: Primary | ICD-10-CM

## 2025-05-15 DIAGNOSIS — E78.2 MIXED HYPERLIPIDEMIA: ICD-10-CM

## 2025-05-15 PROCEDURE — 99214 OFFICE O/P EST MOD 30 MIN: CPT | Performed by: INTERNAL MEDICINE

## 2025-05-15 NOTE — PROGRESS NOTES
"Assessment/Plan:     Diagnosis ICD-10-CM Associated Orders   1. New onset of headaches after age 50  R51.9 MRI brain w wo contrast      2. Dizziness  R42 Holter monitor     Echo complete w/ contrast if indicated     CT coronary calcium score      3. Palpitations  R00.2       4. Mixed hyperlipidemia  E78.2 CT coronary calcium score      5. Neck pain on right side  M54.2 CTA head and neck w wo contrast          Problem List Items Addressed This Visit        Other    Mixed hyperlipidemia    Relevant Orders    CT coronary calcium score    Palpitations   Other Visit Diagnoses       New onset of headaches after age 50    -  Primary    Relevant Orders    MRI brain w wo contrast      Dizziness        Relevant Orders    Holter monitor    Echo complete w/ contrast if indicated    CT coronary calcium score      Neck pain on right side        Relevant Orders    CTA head and neck w wo contrast            No follow-ups on file.      Subjective:    Patient ID: Kayleigh Carrasquillo is a 64 y.o. female    Here for follow up appt- had seen Bethany Chavez this week with new onset headaches since Sunday    Busy going to [place] yesterday with mother  and felt tired and fell asleep  Did boot camp class and felt slightly dizzy.  Has had some palptiations- today has some pain in back of neck - throbbing in back of neck since this am or right side .   Felt flushed and dizzy      The following portions of the patient's history were reviewed and updated as appropriate: allergies, current medications and problem list.     Review of Systems      Objective:    Current Medications[1]    Blood pressure 132/78, pulse 70, height 5' 7\" (1.702 m), weight 73.5 kg (162 lb), SpO2 95%.     Physical Exam          [1]    Current Outpatient Medications:   •  Ascorbic Acid (VITAMIN C ADULT GUMMIES PO), Take by mouth 2 daily ---has zinc/bromelain/elderberry/vit d, Disp: , Rfl:   •  COLLAGEN PO, Take by mouth 1 scoop daily, Disp: , Rfl:   •  esomeprazole (NexIUM) 40 " MG capsule, Take 1 capsule (40 mg total) by mouth daily, Disp: 90 capsule, Rfl: 3  •  metoprolol succinate (TOPROL-XL) 25 mg 24 hr tablet, Take 1 tablet (25 mg total) by mouth daily, Disp: 30 tablet, Rfl: 11

## 2025-05-16 ENCOUNTER — HOSPITAL ENCOUNTER (OUTPATIENT)
Dept: RADIOLOGY | Facility: HOSPITAL | Age: 65
Discharge: HOME | End: 2025-05-16
Attending: INTERNAL MEDICINE
Payer: COMMERCIAL

## 2025-05-16 ENCOUNTER — RESULTS FOLLOW-UP (OUTPATIENT)
Dept: FAMILY MEDICINE CLINIC | Facility: HOSPITAL | Age: 65
End: 2025-05-16

## 2025-05-16 ENCOUNTER — TELEPHONE (OUTPATIENT)
Age: 65
End: 2025-05-16

## 2025-05-16 DIAGNOSIS — M54.2 NECK PAIN ON RIGHT SIDE: ICD-10-CM

## 2025-05-16 DIAGNOSIS — I77.3 FIBROMUSCULAR DYSPLASIA (HCC): Primary | ICD-10-CM

## 2025-05-16 LAB
ALBUMIN SERPL-MCNC: 4.5 G/DL (ref 3.9–4.9)
ALP SERPL-CCNC: 61 IU/L (ref 44–121)
ALT SERPL-CCNC: 20 IU/L (ref 0–32)
AST SERPL-CCNC: 19 IU/L (ref 0–40)
BILIRUB SERPL-MCNC: 0.4 MG/DL (ref 0–1.2)
BUN SERPL-MCNC: 11 MG/DL (ref 8–27)
BUN/CREAT SERPL: 12 (ref 12–28)
CALCIUM SERPL-MCNC: 9.7 MG/DL (ref 8.7–10.3)
CHLORIDE SERPL-SCNC: 104 MMOL/L (ref 96–106)
CO2 SERPL-SCNC: 23 MMOL/L (ref 20–29)
CREAT SERPL-MCNC: 0.91 MG/DL (ref 0.57–1)
EGFR: 71 ML/MIN/1.73
GLOBULIN SER-MCNC: 1.9 G/DL (ref 1.5–4.5)
GLUCOSE SERPL-MCNC: 102 MG/DL (ref 70–99)
MAGNESIUM SERPL-MCNC: 2.2 MG/DL (ref 1.6–2.3)
POTASSIUM SERPL-SCNC: 3.9 MMOL/L (ref 3.5–5.2)
PROT SERPL-MCNC: 6.4 G/DL (ref 6–8.5)
SODIUM SERPL-SCNC: 143 MMOL/L (ref 134–144)
TSH SERPL DL<=0.005 MIU/L-ACNC: 2.27 UIU/ML (ref 0.45–4.5)

## 2025-05-16 PROCEDURE — 70498 CT ANGIOGRAPHY NECK: CPT

## 2025-05-16 PROCEDURE — 70496 CT ANGIOGRAPHY HEAD: CPT

## 2025-05-16 RX ADMIN — IOHEXOL 75 ML: 350 INJECTION, SOLUTION INTRAVENOUS at 13:40

## 2025-05-16 NOTE — TELEPHONE ENCOUNTER
Patient is requesting a call back from provider in regards to lab results. She states she has an appointment at 1pm with a different provider and the results of the test might determine wether her other appointments are needed. Please advise back to patient to further assist.

## 2025-05-16 NOTE — TELEPHONE ENCOUNTER
Kayleigh called back. Read Dr. Tracy note word for word. Patient had no questions.    No further action required.

## 2025-05-17 ENCOUNTER — HOSPITAL ENCOUNTER (OUTPATIENT)
Dept: CT IMAGING | Facility: HOSPITAL | Age: 65
Discharge: HOME | End: 2025-05-17
Attending: INTERNAL MEDICINE
Payer: COMMERCIAL

## 2025-05-17 DIAGNOSIS — R42 DIZZINESS: ICD-10-CM

## 2025-05-17 DIAGNOSIS — E78.2 MIXED HYPERLIPIDEMIA: ICD-10-CM

## 2025-05-17 PROCEDURE — 75571 CT HRT W/O DYE W/CA TEST: CPT

## 2025-05-20 DIAGNOSIS — R73.01 IMPAIRED FASTING GLUCOSE: Primary | ICD-10-CM

## 2025-05-21 LAB
ANA TITR SER IF: NEGATIVE {TITER}
CRP SERPL-MCNC: <1 MG/L (ref 0–10)
LABORATORY COMMENT REPORT: NORMAL

## 2025-06-04 DIAGNOSIS — R00.2 PALPITATIONS: ICD-10-CM

## 2025-06-05 ENCOUNTER — HOSPITAL ENCOUNTER (OUTPATIENT)
Dept: NON INVASIVE DIAGNOSTICS | Age: 65
Discharge: HOME | End: 2025-06-05
Attending: INTERNAL MEDICINE
Payer: COMMERCIAL

## 2025-06-05 VITALS
HEIGHT: 67 IN | WEIGHT: 162 LBS | DIASTOLIC BLOOD PRESSURE: 78 MMHG | BODY MASS INDEX: 25.43 KG/M2 | HEART RATE: 73 BPM | SYSTOLIC BLOOD PRESSURE: 132 MMHG

## 2025-06-05 DIAGNOSIS — R42 DIZZINESS: ICD-10-CM

## 2025-06-05 LAB
AORTIC ROOT: 3.1 CM
AORTIC VALVE MEAN VELOCITY: 8.2 M/S
ASCENDING AORTA: 3 CM
AV LVOT MEAN GRADIENT: 2 MMHG
AV LVOT PEAK GRADIENT: 3 MMHG
AV MEAN PRESS GRAD SYS DOP V1V2: 3 MMHG
AV PEAK GRADIENT: 7 MMHG
AV VELOCITY RATIO: 0.69
AV VMAX SYS DOP: 1.31 M/S
BSA FOR ECHO PROCEDURE: 1.85 M2
DOP CALC AO VTI: 25.52 CM
DOP CALC LVOT PEAK VEL VTI: 17.6 CM
DOP CALC LVOT PEAK VEL: 0.85 M/S
DOP CALC MV VTI: 18.31 CM
E WAVE DECELERATION TIME: 147 MS
E/A RATIO: 0.86
FRACTIONAL SHORTENING: 35 (ref 28–44)
INTERVENTRICULAR SEPTUM IN DIASTOLE (PARASTERNAL SHORT AXIS VIEW): 1 CM
INTERVENTRICULAR SEPTUM: 1 CM (ref 0.6–1.1)
LAAS-AP2: 15.6 CM2
LAAS-AP4: 15.9 CM2
LEFT ATRIUM SIZE: 3.5 CM
LEFT ATRIUM VOLUME (MOD BIPLANE): 45 ML
LEFT ATRIUM VOLUME INDEX (MOD BIPLANE): 24.3 ML/M2
LEFT INTERNAL DIMENSION IN SYSTOLE: 2.8 CM (ref 2.1–4)
LEFT VENTRICLE DIASTOLIC VOLUME (MOD BIPLANE): 123 ML
LEFT VENTRICLE DIASTOLIC VOLUME INDEX (MOD BIPLANE): 66.5 ML/M2
LEFT VENTRICLE SYSTOLIC VOLUME (MOD BIPLANE): 48 ML
LEFT VENTRICLE SYSTOLIC VOLUME INDEX (MOD BIPLANE): 25.9 ML/M2
LEFT VENTRICULAR INTERNAL DIMENSION IN DIASTOLE: 4.3 CM (ref 3.5–6)
LEFT VENTRICULAR POSTERIOR WALL IN END DIASTOLE: 0.8 CM
LEFT VENTRICULAR STROKE VOLUME: 52 ML
LV EF BIPLANE MOD: 61 %
LV EF US.2D.A4C+ESTIMATED: 59 %
LVSV (TEICH): 52 ML
MV E'TISSUE VEL-LAT: 11 CM/S
MV E'TISSUE VEL-SEP: 9 CM/S
MV MEAN GRADIENT: 1 MMHG
MV PEAK A VEL: 0.7 M/S
MV PEAK E VEL: 60 CM/S
MV PEAK GRADIENT: 3 MMHG
MV STENOSIS PRESSURE HALF TIME: 43 MS
MV VALVE AREA P 1/2 METHOD: 5.12
RA PRESSURE ESTIMATED: 3 MMHG
RIGHT ATRIAL 2D VOLUME: 37 ML
RIGHT ATRIUM AREA SYSTOLE A4C: 14.7 CM2
RIGHT VENTRICLE ID DIMENSION: 3.6 CM
RV PSP: 21 MMHG
SINOTUBULAR JUNCTION: 2.8 CM
SL CV LEFT ATRIUM LENGTH A2C: 4.5 CM
SL CV LV EF: 60
SL CV PED ECHO LEFT VENTRICLE DIASTOLIC VOLUME (MOD BIPLANE) 2D: 82 ML
SL CV PED ECHO LEFT VENTRICLE SYSTOLIC VOLUME (MOD BIPLANE) 2D: 30 ML
STJ: 2.8 CM
TR MAX PG: 18 MMHG
TR PEAK VELOCITY: 2.1 M/S
TRICUSPID ANNULAR PLANE SYSTOLIC EXCURSION: 2.1 CM
TRICUSPID VALVE PEAK REGURGITATION VELOCITY: 2.1 M/S

## 2025-06-05 PROCEDURE — 93225 XTRNL ECG REC<48 HRS REC: CPT

## 2025-06-05 PROCEDURE — 93226 XTRNL ECG REC<48 HR SCAN A/R: CPT

## 2025-06-05 PROCEDURE — 93306 TTE W/DOPPLER COMPLETE: CPT

## 2025-06-05 PROCEDURE — 93306 TTE W/DOPPLER COMPLETE: CPT | Performed by: INTERNAL MEDICINE

## 2025-06-05 RX ORDER — METOPROLOL SUCCINATE 25 MG/1
25 TABLET, EXTENDED RELEASE ORAL DAILY
Qty: 90 TABLET | Refills: 4 | Status: SHIPPED | OUTPATIENT
Start: 2025-06-05

## 2025-06-26 ENCOUNTER — TELEPHONE (OUTPATIENT)
Dept: VASCULAR SURGERY | Facility: CLINIC | Age: 65
End: 2025-06-26

## 2025-08-01 ENCOUNTER — TELEPHONE (OUTPATIENT)
Age: 65
End: 2025-08-01

## 2025-08-01 ENCOUNTER — PATIENT MESSAGE (OUTPATIENT)
Dept: FAMILY MEDICINE CLINIC | Facility: MEDICAL CENTER | Age: 65
End: 2025-08-01

## 2025-08-01 DIAGNOSIS — Z12.31 ENCOUNTER FOR SCREENING MAMMOGRAM FOR BREAST CANCER: Primary | ICD-10-CM

## 2025-08-05 ENCOUNTER — TELEPHONE (OUTPATIENT)
Dept: GASTROENTEROLOGY | Facility: CLINIC | Age: 65
End: 2025-08-05

## (undated) DEVICE — SYRINGE 50ML LL

## (undated) DEVICE — GLOVE INDICATOR PI UNDERGLOVE SZ 8 BLUE

## (undated) DEVICE — FENESTRATED BIPOLAR FORCEPS: Brand: ENDOWRIST

## (undated) DEVICE — LARGE NEEDLE DRIVER: Brand: ENDOWRIST

## (undated) DEVICE — SUT STRATAFIX SPIRAL 2-0 CT-1 30 CM SXPP1B410

## (undated) DEVICE — PROGRASP FORCEPS: Brand: ENDOWRIST

## (undated) DEVICE — VISUALIZATION SYSTEM: Brand: CLEARIFY

## (undated) DEVICE — STRL ALLENTOWN HYSTEROSCOPY PK: Brand: CARDINAL HEALTH

## (undated) DEVICE — GLOVE PI ULTRA TOUCH SZ.7.5

## (undated) DEVICE — SPONGE STICK WITH PVP-I: Brand: KENDALL

## (undated) DEVICE — PAD GROUNDING ADULT

## (undated) DEVICE — LLETZ LOOP 15 X 12MM MEGADYNE

## (undated) DEVICE — INTENDED FOR TISSUE SEPARATION, AND OTHER PROCEDURES THAT REQUIRE A SHARP SURGICAL BLADE TO PUNCTURE OR CUT.: Brand: BARD-PARKER SAFETY BLADES SIZE 15, STERILE

## (undated) DEVICE — DRAPE SHEET THREE QUARTER

## (undated) DEVICE — TRAY FOLEY 16FR URIMETER SILICONE SURESTEP

## (undated) DEVICE — VESSEL SEALER EXTEND: Brand: ENDOWRIST

## (undated) DEVICE — MAXI PAD5.51 X 13.78 IN. (14.0 X 35.0 CM)HEAVYCONTOUREDUNSCENTED: Brand: CURITY

## (undated) DEVICE — PENCIL ELECTROSURG E-Z CLEAN -0035H

## (undated) DEVICE — TIP COVER ACCESSORY

## (undated) DEVICE — ARM DRAPE

## (undated) DEVICE — LUBRICANT INST ELECTROLUBE ANTISTK WO PAD

## (undated) DEVICE — BLADELESS OBTURATOR: Brand: WECK VISTA

## (undated) DEVICE — MONOPOLAR CURVED SCISSORS: Brand: ENDOWRIST

## (undated) DEVICE — CHLORAPREP HI-LITE 26ML ORANGE

## (undated) DEVICE — KIT, BETHLEHEM ROBOTIC PROST: Brand: CARDINAL HEALTH

## (undated) DEVICE — AIRSEAL TUBE SMOKE EVAC LUMENX3 FILTERED

## (undated) DEVICE — MAYO STAND COVER: Brand: CONVERTORS

## (undated) DEVICE — SUT MONOCRYL 4-0 PS-2 27 IN Y426H

## (undated) DEVICE — INTENDED FOR TISSUE SEPARATION, AND OTHER PROCEDURES THAT REQUIRE A SHARP SURGICAL BLADE TO PUNCTURE OR CUT.: Brand: BARD-PARKER SAFETY BLADES SIZE 11, STERILE

## (undated) DEVICE — COLUMN DRAPE

## (undated) DEVICE — PREMIUM DRY TRAY LF: Brand: MEDLINE INDUSTRIES, INC.

## (undated) DEVICE — GLOVE INDICATOR PI UNDERGLOVE SZ 7 BLUE

## (undated) DEVICE — ADHESIVE SKIN HIGH VISCOSITY EXOFIN 1ML

## (undated) DEVICE — CHLORHEXIDINE 4PCT 4 OZ

## (undated) DEVICE — CANNULA SEAL

## (undated) DEVICE — STANDARD SURGICAL GOWN, L: Brand: CONVERTORS

## (undated) DEVICE — NEEDLE 25G X 1 1/2

## (undated) DEVICE — 1820 FOAM BLOCK NEEDLE COUNTER: Brand: DEVON

## (undated) DEVICE — MEDI-VAC YANK SUCT HNDL W/TPRD BULBOUS TIP: Brand: CARDINAL HEALTH

## (undated) DEVICE — STERILE CYSTO PACK: Brand: CARDINAL HEALTH

## (undated) DEVICE — ANTIBACTERIAL VIOLET BRAIDED (POLYGLACTIN 910), SYNTHETIC ABSORBABLE SUTURE: Brand: COATED VICRYL

## (undated) DEVICE — LAPAROSCOPIC TROCAR SLEEVE/SINGLE USE: Brand: KII® OPTICAL ACCESS SYSTEM

## (undated) DEVICE — ELECTRODE BALL E-Z CLEAN 5 IN -0009

## (undated) DEVICE — X-RAY DETECTABLE SPONGES,16 PLY: Brand: VISTEC

## (undated) DEVICE — STERILE 8 INCH PROCTO SWAB: Brand: CARDINAL HEALTH